# Patient Record
Sex: FEMALE | Race: WHITE | Employment: OTHER | URBAN - METROPOLITAN AREA
[De-identification: names, ages, dates, MRNs, and addresses within clinical notes are randomized per-mention and may not be internally consistent; named-entity substitution may affect disease eponyms.]

---

## 2017-01-06 ENCOUNTER — APPOINTMENT (OUTPATIENT)
Dept: GENERAL RADIOLOGY | Facility: HOSPITAL | Age: 67
DRG: 470 | End: 2017-01-06
Attending: ORTHOPAEDIC SURGERY
Payer: MEDICARE

## 2017-01-06 ENCOUNTER — ANESTHESIA (OUTPATIENT)
Dept: SURGERY | Facility: HOSPITAL | Age: 67
DRG: 470 | End: 2017-01-06
Payer: MEDICARE

## 2017-01-06 ENCOUNTER — ANESTHESIA EVENT (OUTPATIENT)
Dept: SURGERY | Facility: HOSPITAL | Age: 67
DRG: 470 | End: 2017-01-06
Payer: MEDICARE

## 2017-01-06 ENCOUNTER — SURGERY (OUTPATIENT)
Age: 67
End: 2017-01-06

## 2017-01-06 PROCEDURE — 73560 X-RAY EXAM OF KNEE 1 OR 2: CPT

## 2017-01-06 NOTE — ANESTHESIA PREPROCEDURE EVALUATION
PRE-OP EVALUATION    Patient Name: Lara Garcia    Pre-op Diagnosis: OSTEOARTHRITIS LEFT KNEE    Procedure(s):  LEFT TOTAL KNEE ARTHROPLASTY    Surgeon(s) and Role:     Anderson Quispe MD - Primary    Pre-op vitals reviewed.   Temp: 98.9 °F (37.2 °C) child        Smoking status: Never Smoker     Smokeless tobacco: Not on file    Alcohol Use: Yes    Comment: occasionally       Drug Use: No     Available pre-op labs reviewed.     Lab Results  Component Value Date   WBC 5.9 12/27/2016   RBC 4.26 12/27/2016

## 2017-01-06 NOTE — ANESTHESIA POSTPROCEDURE EVALUATION
[facility] Patient Status:  Surgery Admit   Age/Gender 77year old female MRN [id number]   [facility] SURGERY Attending Sunny Cervantes MD   Hosp Day # 0 PCP CAREY Tristan       Anesthesia Post-op Note    Procedure(s)

## 2017-01-09 PROBLEM — Z47.89 ORTHOPEDIC AFTERCARE: Status: ACTIVE | Noted: 2017-01-09

## 2017-01-29 PROBLEM — T84.89XA POSTOPERATIVE STIFFNESS OF TOTAL KNEE REPLACEMENT, INITIAL ENCOUNTER (HCC): Status: ACTIVE | Noted: 2017-01-29

## 2017-01-29 PROBLEM — Z96.659 POSTOPERATIVE STIFFNESS OF TOTAL KNEE REPLACEMENT, INITIAL ENCOUNTER (HCC): Status: ACTIVE | Noted: 2017-01-29

## 2017-01-29 PROBLEM — M25.669 POSTOPERATIVE STIFFNESS OF TOTAL KNEE REPLACEMENT, INITIAL ENCOUNTER (HCC): Status: ACTIVE | Noted: 2017-01-29

## 2017-01-29 PROBLEM — T84.89XA POSTOPERATIVE STIFFNESS OF TOTAL KNEE REPLACEMENT, INITIAL ENCOUNTER: Status: ACTIVE | Noted: 2017-01-29

## 2017-01-29 PROBLEM — Z96.652 S/P TOTAL KNEE REPLACEMENT USING CEMENT, LEFT: Status: ACTIVE | Noted: 2017-01-29

## 2017-01-29 PROBLEM — Z96.659 POSTOPERATIVE STIFFNESS OF TOTAL KNEE REPLACEMENT, INITIAL ENCOUNTER: Status: ACTIVE | Noted: 2017-01-29

## 2017-01-29 PROBLEM — M25.669 POSTOPERATIVE STIFFNESS OF TOTAL KNEE REPLACEMENT, INITIAL ENCOUNTER: Status: ACTIVE | Noted: 2017-01-29

## 2017-05-31 ENCOUNTER — HOSPITAL ENCOUNTER (OUTPATIENT)
Dept: MAMMOGRAPHY | Facility: HOSPITAL | Age: 67
Discharge: HOME | End: 2017-05-31
Attending: INTERNAL MEDICINE
Payer: MEDICARE

## 2017-05-31 DIAGNOSIS — C50.919 MALIGNANT NEOPLASM OF BREAST (FEMALE) (HCC): ICD-10-CM

## 2017-05-31 DIAGNOSIS — Z12.31 ENCOUNTER FOR SCREENING MAMMOGRAM FOR MALIGNANT NEOPLASM OF BREAST: ICD-10-CM

## 2017-05-31 PROCEDURE — 77067 SCR MAMMO BI INCL CAD: CPT | Performed by: INTERNAL MEDICINE

## 2017-11-07 PROCEDURE — 88175 CYTOPATH C/V AUTO FLUID REDO: CPT | Performed by: OBSTETRICS & GYNECOLOGY

## 2018-06-07 ENCOUNTER — HOSPITAL ENCOUNTER (OUTPATIENT)
Dept: MAMMOGRAPHY | Facility: HOSPITAL | Age: 68
Discharge: HOME | End: 2018-06-07
Attending: INTERNAL MEDICINE
Payer: MEDICARE

## 2018-06-07 DIAGNOSIS — Z12.31 VISIT FOR SCREENING MAMMOGRAM: ICD-10-CM

## 2018-06-07 PROCEDURE — 77067 SCR MAMMO BI INCL CAD: CPT | Performed by: INTERNAL MEDICINE

## 2018-06-07 PROCEDURE — 77063 BREAST TOMOSYNTHESIS BI: CPT | Performed by: INTERNAL MEDICINE

## 2018-06-18 ENCOUNTER — HOSPITAL ENCOUNTER (OUTPATIENT)
Dept: MAMMOGRAPHY | Facility: HOSPITAL | Age: 68
Discharge: HOME | End: 2018-06-18
Attending: INTERNAL MEDICINE
Payer: MEDICARE

## 2018-06-18 DIAGNOSIS — R92.2 INCONCLUSIVE MAMMOGRAM: ICD-10-CM

## 2018-06-18 PROCEDURE — 77062 BREAST TOMOSYNTHESIS BI: CPT | Performed by: INTERNAL MEDICINE

## 2018-06-18 PROCEDURE — 77066 DX MAMMO INCL CAD BI: CPT | Performed by: INTERNAL MEDICINE

## 2018-06-18 PROCEDURE — 76641 ULTRASOUND BREAST COMPLETE: CPT | Performed by: INTERNAL MEDICINE

## 2018-06-18 NOTE — IMAGING NOTE
Assisted Dr. Angie Arauz with Recommendation for a 3 site Ultrasound guided bilateral breast biopsy. Procedure explained and written information given to Siddhartha Quezada. Emotional support provided and all questions answered.   Our breast center schedulers will

## 2018-06-21 ENCOUNTER — HOSPITAL ENCOUNTER (OUTPATIENT)
Dept: MAMMOGRAPHY | Facility: HOSPITAL | Age: 68
Discharge: HOME | End: 2018-06-21
Attending: INTERNAL MEDICINE
Payer: MEDICARE

## 2018-06-21 DIAGNOSIS — R92.8 ABNORMAL MAMMOGRAM: ICD-10-CM

## 2018-06-21 PROCEDURE — 19084 BX BREAST ADD LESION US IMAG: CPT | Performed by: INTERNAL MEDICINE

## 2018-06-21 PROCEDURE — 19083 BX BREAST 1ST LESION US IMAG: CPT | Performed by: INTERNAL MEDICINE

## 2018-06-21 PROCEDURE — 88360 TUMOR IMMUNOHISTOCHEM/MANUAL: CPT

## 2018-06-21 PROCEDURE — 88305 TISSUE EXAM BY PATHOLOGIST: CPT

## 2018-06-21 PROCEDURE — 77066 DX MAMMO INCL CAD BI: CPT | Performed by: INTERNAL MEDICINE

## 2018-06-21 NOTE — IMAGING NOTE
This RN called into Ultrasound biopsy room for pt that \"is going down\". Dr. Lety Benson at bedside. Pt on cart, awake and talking. Diaphoretic and dizzy per tech. Pt states, \"I feel better now. \" Pt was s/p breast biopsy. HR 68, BP lying 134/71.   Enmanuel Davis

## 2018-06-25 ENCOUNTER — TELEPHONE (OUTPATIENT)
Dept: MAMMOGRAPHY | Facility: HOSPITAL | Age: 68
End: 2018-06-25

## 2018-06-25 NOTE — TELEPHONE ENCOUNTER
Telephoned Tushar Sampson and name,  verified with pt. Notified [facility] of left breast US bx result of IDC and ADH and RB US biopsy result of IDC. Emotional support given. Pt sts she was expecting this news.  She declined offer to come in for re

## 2018-06-26 ENCOUNTER — TELEPHONE (OUTPATIENT)
Dept: HEMATOLOGY/ONCOLOGY | Facility: HOSPITAL | Age: 68
End: 2018-06-26

## 2018-06-26 NOTE — TELEPHONE ENCOUNTER
Spoke with patient on the telephone. Introduced myself as breast navigator and explained my role. Explained the multidisciplinary conference and that her case was discussed this morning.  In light of that conversation, it was recommended that she meet with

## 2018-06-27 ENCOUNTER — GENETICS ENCOUNTER (OUTPATIENT)
Dept: GENETICS | Facility: HOSPITAL | Age: 68
End: 2018-06-27
Attending: GENETIC COUNSELOR, MS
Payer: MEDICARE

## 2018-06-27 PROCEDURE — 96040 HC GENETIC COUNSELING EA 30 MIN: CPT | Performed by: GENETIC COUNSELOR, MS

## 2018-06-28 NOTE — PROGRESS NOTES
Referring Provider: Rolena Schwab, MD    Additional Provider:   Betty Rodrigez MD    Reason for Referral:  Raymundo Valdez was referred for genetic counseling because of a personal and family history of breast cancer.   Ms. Ranjeet Barfield is a 79year-old woman following information was discussed with Ms. Reanna Pierre. Genetics of Breast and Ovarian Cancer: In the [place], approximately 1 in 8 women (12%) will develop breast cancer.   Although the vast majority (75-85%) of breast cancer cases are sporadic, ap have a pathogenic variant in one of these genes that Ms. Kenisha Sun did not inherit. In this scenario, options for cancer screening/management should be determined according to personal and family histories and should be discussed with a physicianChristiano strong Genetic testing on Ms. Kim Banda for BRCA1/2 pathogenic variants as part of a multigene panel is indicated. At the conclusion of the counseling session Ms. Kim Banda decided to proceed with genetic testing.   She has a family vacation planned for 7/28/18 a

## 2018-06-29 ENCOUNTER — NURSE NAVIGATOR ENCOUNTER (OUTPATIENT)
Dept: HEMATOLOGY/ONCOLOGY | Facility: HOSPITAL | Age: 68
End: 2018-06-29

## 2018-06-29 ENCOUNTER — OFFICE VISIT (OUTPATIENT)
Dept: SURGERY | Facility: CLINIC | Age: 68
End: 2018-06-29

## 2018-06-29 VITALS
DIASTOLIC BLOOD PRESSURE: 84 MMHG | RESPIRATION RATE: 20 BRPM | WEIGHT: 166 LBS | HEIGHT: 69 IN | HEART RATE: 72 BPM | BODY MASS INDEX: 24.59 KG/M2 | SYSTOLIC BLOOD PRESSURE: 127 MMHG | OXYGEN SATURATION: 99 %

## 2018-06-29 DIAGNOSIS — C50.911 BILATERAL MALIGNANT NEOPLASM OF BREAST IN FEMALE, ESTROGEN RECEPTOR POSITIVE, UNSPECIFIED SITE OF BREAST (HCC): Primary | ICD-10-CM

## 2018-06-29 DIAGNOSIS — Z17.0 BILATERAL MALIGNANT NEOPLASM OF BREAST IN FEMALE, ESTROGEN RECEPTOR POSITIVE, UNSPECIFIED SITE OF BREAST (HCC): Primary | ICD-10-CM

## 2018-06-29 DIAGNOSIS — C50.912 BILATERAL MALIGNANT NEOPLASM OF BREAST IN FEMALE, ESTROGEN RECEPTOR POSITIVE, UNSPECIFIED SITE OF BREAST (HCC): Primary | ICD-10-CM

## 2018-06-29 PROCEDURE — 99205 OFFICE O/P NEW HI 60 MIN: CPT | Performed by: SURGERY

## 2018-06-29 RX ORDER — AMPICILLIN TRIHYDRATE 250 MG
500 CAPSULE ORAL DAILY
COMMUNITY

## 2018-06-29 RX ORDER — ATORVASTATIN CALCIUM 10 MG/1
20 TABLET, FILM COATED ORAL NIGHTLY
COMMUNITY
End: 2020-02-25

## 2018-06-29 NOTE — PROGRESS NOTES
Met with patient in clinic. Introduced myself as the breast navigator nurse and explained my role and how I will work with all of the physicians involved in her care.  Explained the role of all of the physicians involved in her care including the surgeonkavin

## 2018-06-29 NOTE — PROGRESS NOTES
Reviewed plan of care. I let her know someone would contact her with an appointment with plastic surgery. Questions addressed.

## 2018-06-29 NOTE — PROGRESS NOTES
Breast Surgery New Patient Consultation    This is the first visit for this 79year old woman, referred by Dr. Beau Beckett, who presents for evaluation of breast cancer.     History of Present Illness:   Ms. Nenita Villa is a 79year old woman who has a per melanoma-left shoulder   • Menopause     at age 46   • OLIVER (obstructive sleep apnea) 10/10/16-PSG    AHI 8 RDI 14 REM AHI 6 Supine AHI 13 non-supine AHI 2.3 Sao2 Kee 88%   • Osteoarthritis    • Shortness of breath     sometimes with exertion    • Sleep a chills, night sweats, fatigue, generalized weakness, change in appetite or weight loss. HEENT:     The patient denies eye irritation, cataracts, redness, glaucoma, yellowing of the eyes, change in vision or color blindness.  The patient denies hearing lo problems, trembling/tremors, fainting/black outs, dizziness, memory problems, loss of sensation/numbness, problems walking, weakness, tingling or burning in hands/feet.  There is no history of abusive relationship, bipolar disorder, sleep disturbance, anxie non tender. The liver is not enlarged. There are no palpable masses. Lymph Nodes: The supraclavicular, axillary and cervical regions are free of significant lymphadenopathy. Back: There is no vertebral column tenderness.     Skin: The skin appears no equal with these two approaches. If breast conservation is elected, I explained the need for free margins, the possibility of re-excision to achieve free margins, and the need for post-operative radiotherapy.  The approach to phuong staging was also describe

## 2018-07-13 ENCOUNTER — GENETICS ENCOUNTER (OUTPATIENT)
Dept: HEMATOLOGY/ONCOLOGY | Facility: HOSPITAL | Age: 68
End: 2018-07-13

## 2018-07-13 NOTE — PROGRESS NOTES
Mary Thayer had genetic testing performed on 6/27/18 because of a personal and family history of breast cancer. No mutation was found in the following 9 genes: VANDANA, BRCA1, BRCA2, CDH1, CHEK2, PALB2, PTEN, STK11, and TP53.   Please refer to the report

## 2018-07-18 ENCOUNTER — GENETICS ENCOUNTER (OUTPATIENT)
Dept: HEMATOLOGY/ONCOLOGY | Facility: HOSPITAL | Age: 68
End: 2018-07-18

## 2018-07-18 NOTE — PROGRESS NOTES
Referring Provider:       Juan Rao MD     Additional Provider:     MD Radha Brantley MD    Reason for Referral:  Myriam Douglas had genetic testing performed on 6/27/18 because of a personal and family history of breast cancer. history of cancer. Individuals who inherit a MUTYH mutation from each parent have the condition MUTYH-associated polyposis (MAP).   Affected individuals typically develop moderate polyposis by their 30s-50s with colorectal cancer developing at a mean with any additional questions about her genetic test results.       Cc:  Audrey Pope

## 2018-07-23 ENCOUNTER — MEDICAL CORRESPONDENCE (OUTPATIENT)
Dept: SURGERY | Facility: CLINIC | Age: 68
End: 2018-07-23

## 2018-07-23 NOTE — PROGRESS NOTES
Received Pt's pre op clearance from Jewell Aponte D.O.'s office. Pt has Consult tomorrow 7/24/18 with Dr. Keren Low to discuss options for breast reconstruction, and has a TENTATIVE hold date for surgery, joint with Dr. Sofia Shone, on 8/13/18.  Awaiting s

## 2018-07-24 ENCOUNTER — OFFICE VISIT (OUTPATIENT)
Dept: SURGERY | Facility: CLINIC | Age: 68
End: 2018-07-24
Payer: MEDICARE

## 2018-07-24 VITALS — BODY MASS INDEX: 25.32 KG/M2 | WEIGHT: 169 LBS | HEIGHT: 68.5 IN

## 2018-07-24 DIAGNOSIS — Z90.13 ABSENCE OF BREAST, BILATERAL: Primary | ICD-10-CM

## 2018-07-24 PROCEDURE — 99203 OFFICE O/P NEW LOW 30 MIN: CPT | Performed by: SURGERY

## 2018-07-24 NOTE — CONSULTS
New Patient Consultation    This is the first visit for this 76year old female who presents to discuss reconstructive options following surgery for breast cancer. History of Present Illness:    The patient is a 76year old female who presents with a byron No outpatient prescriptions have been marked as taking for the 7/24/18 encounter (Office Visit) with Valarie Scott MD.      Allergies:      Codeine                 OTHER (SEE COMMENTS)    Comment:Agitated and Jittery  Morphine                Severa Loveless pain.    Genitourinary:  The patient denies frequent urination, needing to get up at night to urinate, urinary hesitancy or retaining urine, painful urination, urinary incontinence, decreased urine stream, blood in urine, or vaginal/penile discharge.     Sk to inframammary fold 12cm, base diameter 15cm. The skin, nipple, and areola appear normal.  There is no nipple retraction or discharge. There are no dominant masses in the breast.     Left breast:  Grade 2 ptosis is noted. Striae are noted.  Measurements were reviewed.  The expected post-operative course was discussed including the need for activity limitation, drains, and suture removal. Finally, we reviewed the impact of post-mastectomy radiation therapy on implant-based reconstruction (should it be deeme

## 2018-07-25 ENCOUNTER — TELEPHONE (OUTPATIENT)
Dept: HEMATOLOGY/ONCOLOGY | Facility: HOSPITAL | Age: 68
End: 2018-07-25

## 2018-07-25 NOTE — TELEPHONE ENCOUNTER
Spoke with patient on the telephone regarding surgical planning. Patient wishes to move forward with bilateral mastectomies without reconstruction.  Emotional support given and we discussed how that once surgery is done, we will work with her to get her to

## 2018-07-26 ENCOUNTER — TELEPHONE (OUTPATIENT)
Dept: SURGERY | Facility: CLINIC | Age: 68
End: 2018-07-26

## 2018-07-26 NOTE — TELEPHONE ENCOUNTER
Questions addressed related to post op questions. Reviewed pre op instructions as well, and copy mailed to her home. Pt to call with any additional questions.

## 2018-08-13 ENCOUNTER — TELEPHONE (OUTPATIENT)
Dept: MAMMOGRAPHY | Facility: HOSPITAL | Age: 68
End: 2018-08-13

## 2018-08-13 NOTE — TELEPHONE ENCOUNTER
LM to call back. Attempting to reach pt to discuss Andres SLN procedure done in [facility] [place] [facility] prior to Andres mastectomy with Dr. Karen Hernandez.

## 2018-08-14 ENCOUNTER — APPOINTMENT (OUTPATIENT)
Dept: LAB | Facility: HOSPITAL | Age: 68
End: 2018-08-14
Payer: MEDICARE

## 2018-08-14 DIAGNOSIS — C50.911 BILATERAL MALIGNANT NEOPLASM OF BREAST IN FEMALE, ESTROGEN RECEPTOR POSITIVE, UNSPECIFIED SITE OF BREAST (HCC): ICD-10-CM

## 2018-08-14 DIAGNOSIS — C50.912 BILATERAL MALIGNANT NEOPLASM OF BREAST IN FEMALE, ESTROGEN RECEPTOR POSITIVE, UNSPECIFIED SITE OF BREAST (HCC): ICD-10-CM

## 2018-08-14 DIAGNOSIS — Z17.0 BILATERAL MALIGNANT NEOPLASM OF BREAST IN FEMALE, ESTROGEN RECEPTOR POSITIVE, UNSPECIFIED SITE OF BREAST (HCC): ICD-10-CM

## 2018-08-14 LAB
ANION GAP SERPL CALC-SCNC: 6 MMOL/L (ref 0–18)
BUN BLD-MCNC: 20 MG/DL (ref 8–20)
BUN/CREAT SERPL: 20.4 (ref 10–20)
CALCIUM BLD-MCNC: 9.2 MG/DL (ref 8.3–10.3)
CHLORIDE SERPL-SCNC: 106 MMOL/L (ref 101–111)
CO2 SERPL-SCNC: 29 MMOL/L (ref 22–32)
CREAT BLD-MCNC: 0.98 MG/DL (ref 0.55–1.02)
GLUCOSE BLD-MCNC: 96 MG/DL (ref 70–99)
OSMOLALITY SERPL CALC.SUM OF ELEC: 294 MOSM/KG (ref 275–295)
POTASSIUM SERPL-SCNC: 4.6 MMOL/L (ref 3.6–5.1)
SODIUM SERPL-SCNC: 141 MMOL/L (ref 136–144)

## 2018-08-14 PROCEDURE — 80048 BASIC METABOLIC PNL TOTAL CA: CPT

## 2018-08-14 PROCEDURE — 36415 COLL VENOUS BLD VENIPUNCTURE: CPT

## 2018-08-16 ENCOUNTER — HOSPITAL ENCOUNTER (OUTPATIENT)
Facility: HOSPITAL | Age: 68
Discharge: HOME | End: 2018-08-17
Attending: SURGERY | Admitting: SURGERY
Payer: MEDICARE

## 2018-08-16 ENCOUNTER — SURGERY (OUTPATIENT)
Age: 68
End: 2018-08-16

## 2018-08-16 ENCOUNTER — HOSPITAL ENCOUNTER (OUTPATIENT)
Dept: NUCLEAR MEDICINE | Facility: HOSPITAL | Age: 68
Discharge: HOME | End: 2018-08-16
Attending: SURGERY
Payer: MEDICARE

## 2018-08-16 ENCOUNTER — ANESTHESIA (OUTPATIENT)
Dept: SURGERY | Facility: HOSPITAL | Age: 68
End: 2018-08-16
Payer: MEDICARE

## 2018-08-16 ENCOUNTER — ANESTHESIA EVENT (OUTPATIENT)
Dept: SURGERY | Facility: HOSPITAL | Age: 68
End: 2018-08-16
Payer: MEDICARE

## 2018-08-16 DIAGNOSIS — Z17.0 BILATERAL MALIGNANT NEOPLASM OF BREAST IN FEMALE, ESTROGEN RECEPTOR POSITIVE, UNSPECIFIED SITE OF BREAST (HCC): ICD-10-CM

## 2018-08-16 DIAGNOSIS — C50.912 BILATERAL MALIGNANT NEOPLASM OF BREAST IN FEMALE, ESTROGEN RECEPTOR POSITIVE, UNSPECIFIED SITE OF BREAST (HCC): ICD-10-CM

## 2018-08-16 DIAGNOSIS — Z17.0 BILATERAL MALIGNANT NEOPLASM OF BREAST IN FEMALE, ESTROGEN RECEPTOR POSITIVE, UNSPECIFIED SITE OF BREAST (HCC): Primary | ICD-10-CM

## 2018-08-16 DIAGNOSIS — C50.911 BILATERAL MALIGNANT NEOPLASM OF BREAST IN FEMALE, ESTROGEN RECEPTOR POSITIVE, UNSPECIFIED SITE OF BREAST (HCC): ICD-10-CM

## 2018-08-16 DIAGNOSIS — C50.912 BILATERAL MALIGNANT NEOPLASM OF BREAST IN FEMALE, ESTROGEN RECEPTOR POSITIVE, UNSPECIFIED SITE OF BREAST (HCC): Primary | ICD-10-CM

## 2018-08-16 DIAGNOSIS — C50.911 BILATERAL MALIGNANT NEOPLASM OF BREAST IN FEMALE, ESTROGEN RECEPTOR POSITIVE, UNSPECIFIED SITE OF BREAST (HCC): Primary | ICD-10-CM

## 2018-08-16 PROCEDURE — 07B50ZX EXCISION OF RIGHT AXILLARY LYMPHATIC, OPEN APPROACH, DIAGNOSTIC: ICD-10-PCS | Performed by: SURGERY

## 2018-08-16 PROCEDURE — 07B60ZX EXCISION OF LEFT AXILLARY LYMPHATIC, OPEN APPROACH, DIAGNOSTIC: ICD-10-PCS | Performed by: SURGERY

## 2018-08-16 PROCEDURE — 88342 IMHCHEM/IMCYTCHM 1ST ANTB: CPT | Performed by: SURGERY

## 2018-08-16 PROCEDURE — 78195 LYMPH SYSTEM IMAGING: CPT | Performed by: SURGERY

## 2018-08-16 PROCEDURE — 88331 PATH CONSLTJ SURG 1 BLK 1SPC: CPT | Performed by: SURGERY

## 2018-08-16 PROCEDURE — 88360 TUMOR IMMUNOHISTOCHEM/MANUAL: CPT | Performed by: SURGERY

## 2018-08-16 PROCEDURE — 88341 IMHCHEM/IMCYTCHM EA ADD ANTB: CPT | Performed by: SURGERY

## 2018-08-16 PROCEDURE — A4216 STERILE WATER/SALINE, 10 ML: HCPCS

## 2018-08-16 PROCEDURE — 88307 TISSUE EXAM BY PATHOLOGIST: CPT | Performed by: SURGERY

## 2018-08-16 PROCEDURE — 0HBV0ZZ EXCISION OF BILATERAL BREAST, OPEN APPROACH: ICD-10-PCS | Performed by: SURGERY

## 2018-08-16 RX ORDER — DEXAMETHASONE SODIUM PHOSPHATE 4 MG/ML
4 VIAL (ML) INJECTION AS NEEDED
Status: DISCONTINUED | OUTPATIENT
Start: 2018-08-16 | End: 2018-08-16 | Stop reason: HOSPADM

## 2018-08-16 RX ORDER — NALOXONE HYDROCHLORIDE 0.4 MG/ML
80 INJECTION, SOLUTION INTRAMUSCULAR; INTRAVENOUS; SUBCUTANEOUS AS NEEDED
Status: DISCONTINUED | OUTPATIENT
Start: 2018-08-16 | End: 2018-08-16 | Stop reason: HOSPADM

## 2018-08-16 RX ORDER — DEXTROSE, SODIUM CHLORIDE, AND POTASSIUM CHLORIDE 5; .45; .15 G/100ML; G/100ML; G/100ML
INJECTION INTRAVENOUS CONTINUOUS
Status: DISCONTINUED | OUTPATIENT
Start: 2018-08-16 | End: 2018-08-17

## 2018-08-16 RX ORDER — LIDOCAINE AND PRILOCAINE 25; 25 MG/G; MG/G
CREAM TOPICAL ONCE
Status: COMPLETED | OUTPATIENT
Start: 2018-08-16 | End: 2018-08-16

## 2018-08-16 RX ORDER — ATENOLOL 25 MG/1
25 TABLET ORAL NIGHTLY
Status: DISCONTINUED | OUTPATIENT
Start: 2018-08-16 | End: 2018-08-17

## 2018-08-16 RX ORDER — DEXTROSE, SODIUM CHLORIDE, AND POTASSIUM CHLORIDE 5; .45; .15 G/100ML; G/100ML; G/100ML
INJECTION INTRAVENOUS
Status: COMPLETED
Start: 2018-08-16 | End: 2018-08-16

## 2018-08-16 RX ORDER — IBUPROFEN 200 MG
200 TABLET ORAL EVERY 4 HOURS PRN
Status: DISCONTINUED | OUTPATIENT
Start: 2018-08-16 | End: 2018-08-17

## 2018-08-16 RX ORDER — DOCUSATE SODIUM 100 MG/1
100 CAPSULE, LIQUID FILLED ORAL 2 TIMES DAILY
Status: DISCONTINUED | OUTPATIENT
Start: 2018-08-16 | End: 2018-08-17

## 2018-08-16 RX ORDER — ONDANSETRON 2 MG/ML
4 INJECTION INTRAMUSCULAR; INTRAVENOUS AS NEEDED
Status: DISCONTINUED | OUTPATIENT
Start: 2018-08-16 | End: 2018-08-16 | Stop reason: HOSPADM

## 2018-08-16 RX ORDER — MORPHINE SULFATE 4 MG/ML
2 INJECTION, SOLUTION INTRAMUSCULAR; INTRAVENOUS EVERY 5 MIN PRN
Status: DISCONTINUED | OUTPATIENT
Start: 2018-08-16 | End: 2018-08-16 | Stop reason: HOSPADM

## 2018-08-16 RX ORDER — IBUPROFEN 600 MG/1
600 TABLET ORAL EVERY 4 HOURS PRN
Status: DISCONTINUED | OUTPATIENT
Start: 2018-08-16 | End: 2018-08-17

## 2018-08-16 RX ORDER — DIAZEPAM 5 MG/1
5 TABLET ORAL AS NEEDED
Status: DISCONTINUED | OUTPATIENT
Start: 2018-08-16 | End: 2018-08-16 | Stop reason: HOSPADM

## 2018-08-16 RX ORDER — CEFAZOLIN SODIUM/WATER 2 G/20 ML
2 SYRINGE (ML) INTRAVENOUS ONCE
Status: DISCONTINUED | OUTPATIENT
Start: 2018-08-16 | End: 2018-08-16 | Stop reason: HOSPADM

## 2018-08-16 RX ORDER — ONDANSETRON 2 MG/ML
4 INJECTION INTRAMUSCULAR; INTRAVENOUS EVERY 6 HOURS PRN
Status: DISCONTINUED | OUTPATIENT
Start: 2018-08-16 | End: 2018-08-17

## 2018-08-16 RX ORDER — DIPHENHYDRAMINE HCL 25 MG
25 CAPSULE ORAL NIGHTLY PRN
Status: DISCONTINUED | OUTPATIENT
Start: 2018-08-16 | End: 2018-08-17

## 2018-08-16 RX ORDER — ACETAMINOPHEN 500 MG
1000 TABLET ORAL ONCE
Status: DISCONTINUED | OUTPATIENT
Start: 2018-08-16 | End: 2018-08-16 | Stop reason: HOSPADM

## 2018-08-16 RX ORDER — SODIUM CHLORIDE, SODIUM LACTATE, POTASSIUM CHLORIDE, CALCIUM CHLORIDE 600; 310; 30; 20 MG/100ML; MG/100ML; MG/100ML; MG/100ML
INJECTION, SOLUTION INTRAVENOUS CONTINUOUS
Status: DISCONTINUED | OUTPATIENT
Start: 2018-08-16 | End: 2018-08-16 | Stop reason: HOSPADM

## 2018-08-16 RX ORDER — IBUPROFEN 400 MG/1
400 TABLET ORAL EVERY 4 HOURS PRN
Status: DISCONTINUED | OUTPATIENT
Start: 2018-08-16 | End: 2018-08-17

## 2018-08-16 RX ORDER — TRAMADOL HYDROCHLORIDE 50 MG/1
TABLET ORAL EVERY 6 HOURS PRN
Status: DISCONTINUED | OUTPATIENT
Start: 2018-08-16 | End: 2018-08-17

## 2018-08-16 RX ORDER — ATORVASTATIN CALCIUM 10 MG/1
10 TABLET, FILM COATED ORAL NIGHTLY
Status: DISCONTINUED | OUTPATIENT
Start: 2018-08-16 | End: 2018-08-17

## 2018-08-16 RX ORDER — METOCLOPRAMIDE HYDROCHLORIDE 5 MG/ML
10 INJECTION INTRAMUSCULAR; INTRAVENOUS AS NEEDED
Status: DISCONTINUED | OUTPATIENT
Start: 2018-08-16 | End: 2018-08-16 | Stop reason: HOSPADM

## 2018-08-16 RX ORDER — MIDAZOLAM HYDROCHLORIDE 1 MG/ML
1 INJECTION INTRAMUSCULAR; INTRAVENOUS EVERY 5 MIN PRN
Status: DISCONTINUED | OUTPATIENT
Start: 2018-08-16 | End: 2018-08-16 | Stop reason: HOSPADM

## 2018-08-16 RX ORDER — ATENOLOL 50 MG/1
25 TABLET ORAL NIGHTLY
COMMUNITY

## 2018-08-16 RX ORDER — LIDOCAINE AND PRILOCAINE 25; 25 MG/G; MG/G
CREAM TOPICAL
Status: COMPLETED
Start: 2018-08-16 | End: 2018-08-16

## 2018-08-16 NOTE — PROGRESS NOTES
NURSING ADMISSION NOTE      Patient admitted via Cart  Oriented to room. Safety precautions initiated. Bed in low position. Call light in reach. RECEIVED PATIENT FROM PACU , ALERT AND ORIENT X 4 , ON ROOM AIR , CPOX , O2 SAT 96% .  B/L BREAST DRESSIN

## 2018-08-16 NOTE — IMAGING NOTE
Nuc Med performed Bilateral Lymphoscintigraphy of breast for LN removal and bilateral mastectomy. Procedure explained and all questions answered. Pt verbalized understanding. Emotional support provided and pt tolerated procedure well with some discomfort.

## 2018-08-16 NOTE — ANESTHESIA PREPROCEDURE EVALUATION
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 Cardiovascular      ECG reviewed.   Exercise tolerance: good     MET: >4      (+) hypertension                  (+) dysrhythmias and PVC                  Endo/Other                           (+) arthritis

## 2018-08-16 NOTE — BRIEF OP NOTE
Pre-Operative Diagnosis: Bilateral malignant neoplasm of breast in female, estrogen receptor positive, unspecified site of breast ([facility] [C50.911, Z17.0, C50.912]     Post-Operative Diagnosis: Bilateral malignant neoplasm of breast in female, estrogen recep

## 2018-08-16 NOTE — ANESTHESIA POSTPROCEDURE EVALUATION
[facility] Patient Status:  Surgery Admit   Age/Gender 76year old female MRN [id number]   Location [facility] Attending Aristides Alvares MD   Hosp Day # 0 PCP CAREY Mendoza       Anesthesia Post

## 2018-08-16 NOTE — PAYOR COMM NOTE
--------------  ADMISSION REVIEW     Payor: BCBS MEDICARE ADV PPO  Subscriber #:  [id number]  Authorization Number: N/A    Admit date: 8/16/18  Admit time: 1448       Admitting Physician: Butch Woods MD  Attending Physician:  Butch Woods MD She denies any palpable masses, skin changes, nipple discharge or axillary symptoms. She does have a remote history of a left breast needle biopsy in 1997 that was reportedly benign.   She has a family history of breast cancer and met with our genetic coun Morphine                HALLUCINATION  Vicodin [Hydrocodon*    HALLUCINATION     Family History:         Family History   Problem Relation Age of Onset   • Breast Cancer Self 61   • Breast Cancer Mother [de-identified]   • kidney cancer [OTHER] Father     • Breast Canc The patient denies frequent urination, +needing to get up at night to urinate, urinary hesitancy or retaining urine, painful urination, urinary incontinence, decreased urine stream, blood in the urine or vaginal/penile discharge.     Skin:    The patient d Right breast: The skin, nipple ,and areola appear normal. There is no skin dimpling with movement of the pectoralis. There is no nipple retraction. No nipple discharge can be elicited. The parenchyma is mildly nodular.  There are no dominant masses in the b We discussed the significance and implications of a genetic mutation including how this may affect her surgical decision making. The patient has a personal history of right breast cancer as well as family history of breast cancer.   Though she had a prior Following this discussion, where all of the patient's questions were answered, the patient conveyed that she would be interested in bilateral mastectomies if she is found to be a genetic carrier. Currently those results are not available.   I have therefor 8/16/2018 0811 Given 5 mg Oral Jace Shen RN      dextrose 5 % and 0.45 % NaCl with KCl 20 mEq infusion     Date Action Dose Route User    8/16/2018 1358 New Bag (none) Intravenous Vicente Carrasco, RN      lactated ringers infusion     Date

## 2018-08-16 NOTE — H&P
History of Present Illness:   Ms. Belen Thompson is a 79year old woman who has a personal history of right breast cancer treated in June 2013 with lumpectomy, sentinel lymph node biopsy (2 lymph nodes removed at that time ER/RI positive, HER-2/misael negati Sao2 Kee 88%   • Osteoarthritis     • Shortness of breath       sometimes with exertion    • Sleep apnea     • Visual impairment       glasses         Past Surgical History:  No date: D & C      Comment: x 2  6/2013: LUMPECTOMY RIGHT      Comment: Intrac irritation, cataracts, redness, glaucoma, yellowing of the eyes, change in vision or color blindness.  The patient denies hearing loss, ringing in the ears, ear drainage, earaches, nasal congestion, nose bleeds, snoring, pain in mouth/throat, hoarseness, ch walking, weakness, tingling or burning in hands/feet. There is no history of abusive relationship, bipolar disorder, sleep disturbance, anxiety, depression or feeling of despair.     Endocrine:     There is no history of poor/slow wound healing, weight loss supraclavicular, axillary and cervical regions are free of significant lymphadenopathy.     Back: There is no vertebral column tenderness.     Skin: The skin appears normal. There are no suspicious appearing rashes or lesions.     Extremities:  The extremit the need for free margins, the possibility of re-excision to achieve free margins, and the need for post-operative radiotherapy.  The approach to phuong staging was also described, including the technique, risks and benefits of sentinel node biopsy, the poss risks and side effects of this procedure; the likelihood of the patient achieving goals; and potential problems that might occur during recuperation.   I discussed reasonable alternatives to the procedure, including risks, benefits and side effects related

## 2018-08-17 ENCOUNTER — NURSE NAVIGATOR ENCOUNTER (OUTPATIENT)
Dept: HEMATOLOGY/ONCOLOGY | Facility: HOSPITAL | Age: 68
End: 2018-08-17

## 2018-08-17 VITALS
OXYGEN SATURATION: 97 % | DIASTOLIC BLOOD PRESSURE: 65 MMHG | WEIGHT: 170 LBS | SYSTOLIC BLOOD PRESSURE: 133 MMHG | HEART RATE: 65 BPM | HEIGHT: 68.5 IN | BODY MASS INDEX: 25.47 KG/M2 | TEMPERATURE: 98 F | RESPIRATION RATE: 18 BRPM

## 2018-08-17 RX ORDER — TRAMADOL HYDROCHLORIDE 50 MG/1
TABLET ORAL EVERY 6 HOURS PRN
Qty: 30 TABLET | Refills: 0 | Status: SHIPPED | OUTPATIENT
Start: 2018-08-17 | End: 2018-10-09 | Stop reason: ALTCHOICE

## 2018-08-17 NOTE — PROGRESS NOTES
Patient discharged home at this time. All discharge instructions were reviewed with the patient and her  at the bedside. TRISTAN drain teaching was completed with breast care coordinator, Joseph RN, patient verbalized understanding.  IV access was remove

## 2018-08-17 NOTE — OPERATIVE REPORT
[address]    PATIENT'S NAME: Jasper HURD   ATTENDING PHYSICIAN: Fawn Yoon. Elan Conti M.D. OPERATING PHYSICIAN: Fawn Conti M.D.    PATIENT ACCOUNT#:   [de-identified]    LOCATION:  [facility] OR [place] ROOMS 15 EDWP [address] #:    therapy. The approach to phuong staging was also described including the technique, risks, and benefits of sentinel node biopsy, the possible need for axillary dissection, and the long-term sequelae of this procedure.   She has had a sentinel lymph node bio sent for frozen section analysis on the right, the results of which were negative for the presence of metastatic disease, and, therefore, no further axillary surgery was performed of the right breast.  We then infiltrated the incision line with tumescent s dissection was performed. The tumescent solution was then instilled into the plane between the subcutaneous fat and breast gland with a standard-sized trocar along the incision line. A 15 blade knife was used to incise the skin.   Electrocautery was used

## 2018-08-17 NOTE — PLAN OF CARE
Assumed pt care at 1 for the night shift. Pt s/p bilt mastectomy. POD #0. Resting in bed. Reports mild discomfort to right arm with movement and activity. Surgical bra in place. Drgs c/d/i.TRISTAN x 2 w/bloody output in small amount. IVF infusing as ordered. V

## 2018-08-17 NOTE — PAYOR COMM NOTE
--------------  ADMISSION REVIEW     Payor: BCBS MEDICARE ADV PPO  Subscriber #:  [id number]  Authorization Number: N/A    Admit date: 8/16/18  Admit time: 1448       Admitting Physician: Rosalie Rich MD  Attending Physician:  Rosalie Rich MD reportedly benign. She has a family history of breast cancer and met with our genetic counselor yesterday and genetic testing is currently pending.     She is here today for evaluation and recommendations for further therapy.              Past Medical Hist Breast Cancer Mother [de-identified]   • kidney cancer [OTHER] Father     • Breast Cancer Maternal Grandmother     • Breast Cancer Paternal Grandmother           She is not of Ashkenazi Catholic ancestry.     Social History:     Alcohol use Yes   Comment: occasionally discharge.     Skin:    The patient denies rash, itching, skin lesions, dry skin, change in skin color or change in moles.      Hematologic/Lymphatic:  The patient denies easily bruising or bleeding or persistent swollen glands or lymph nodes.      Musculo There are no dominant masses in the breast. The axillary tail is normal.  There are well-healed incisions on the breast and axilla with no underlying palpable masses.   Left breast:   The skin, nipple, and areola appear normal. There is no skin dimpling wit successful lymphoscintigraphy mapping for purposes of axillary staging.     The natural history and evolution of breast cancer were discussed with Ms.  Avila Weaver and her family, including the difference between in-situ and invasive carcinoma, and the those questions were answered to her satisfaction. She has been  encouraged to contact the office with any questions or concerns prior to her surgery.     Pre-op Diagnosis: Bilateral malignant neoplasm of breast in female, estrogen receptor positive, unspec 50 mg Oral Sunny Nunn RN    8/17/2018 0002 Given 50 mg Oral Sunny Nunn RN        OPERATIVE REPORT     PREOPERATIVE DIAGNOSIS:  Bilateral breast cancer. POSTOPERATIVE DIAGNOSIS:  Bilateral breast cancer.   PROCEDURE PERFORMED:  Bilateral simpl nodes removed previously in the right breast and, therefore, we discussed that she may not have a successful lymphoscintigraphy mapping for purposes of axillary staging but that this will be tried preoperatively to see if this was a possibility.   Preoperat the plane between the subcutaneous fat and breast gland with a standard-sized trocar. The right breast incision was completed with a 15 blade knife for the skin and electrocautery was used for hemostasis.   Then, using sharp dissection and electrocautery, Maik Supercut face-lift scissors and electrocautery were then used to raise the mastectomy flaps on the left to the borders of the clavicle, sternum, inframammary fold, and latissimus tendon laterally, and the left breast was then dissected off the under

## 2018-08-20 ENCOUNTER — TELEPHONE (OUTPATIENT)
Dept: SURGERY | Facility: CLINIC | Age: 68
End: 2018-08-20

## 2018-08-20 NOTE — TELEPHONE ENCOUNTER
I contacted the patient to see how she is doing after her procedure. She is feeling well, eating and drinking, taking walks. She is managing the drains without a problem. Some discomfort by the drains after she strips the tubing.    I let her know the p

## 2018-08-22 ENCOUNTER — TELEPHONE (OUTPATIENT)
Dept: SURGERY | Facility: CLINIC | Age: 68
End: 2018-08-22

## 2018-08-22 NOTE — TELEPHONE ENCOUNTER
I contacted the patient regarding her final pathology. Let her know 1 SLN on the right was negative, and 2 SLN on the left were negative. All margins were negative. Confirmed her post op appt, and drain output requirements to have them removed.    Pt

## 2018-08-28 ENCOUNTER — OFFICE VISIT (OUTPATIENT)
Dept: SURGERY | Facility: CLINIC | Age: 68
End: 2018-08-28
Payer: MEDICARE

## 2018-08-28 ENCOUNTER — NURSE NAVIGATOR ENCOUNTER (OUTPATIENT)
Dept: HEMATOLOGY/ONCOLOGY | Facility: HOSPITAL | Age: 68
End: 2018-08-28

## 2018-08-28 VITALS
WEIGHT: 170 LBS | TEMPERATURE: 97 F | HEART RATE: 67 BPM | SYSTOLIC BLOOD PRESSURE: 172 MMHG | DIASTOLIC BLOOD PRESSURE: 76 MMHG | RESPIRATION RATE: 97 BRPM | OXYGEN SATURATION: 97 % | HEIGHT: 68.5 IN | BODY MASS INDEX: 25.47 KG/M2

## 2018-08-28 DIAGNOSIS — C50.912 BILATERAL MALIGNANT NEOPLASM OF BREAST IN FEMALE, ESTROGEN RECEPTOR POSITIVE, UNSPECIFIED SITE OF BREAST (HCC): Primary | ICD-10-CM

## 2018-08-28 DIAGNOSIS — Z17.0 BILATERAL MALIGNANT NEOPLASM OF BREAST IN FEMALE, ESTROGEN RECEPTOR POSITIVE, UNSPECIFIED SITE OF BREAST (HCC): Primary | ICD-10-CM

## 2018-08-28 DIAGNOSIS — C50.911 BILATERAL MALIGNANT NEOPLASM OF BREAST IN FEMALE, ESTROGEN RECEPTOR POSITIVE, UNSPECIFIED SITE OF BREAST (HCC): Primary | ICD-10-CM

## 2018-08-28 PROCEDURE — 99024 POSTOP FOLLOW-UP VISIT: CPT | Performed by: SURGERY

## 2018-08-28 NOTE — PROGRESS NOTES
Met with patient in clinic, states she is doing well post surgery. Recommendation is to meet with medical oncology, which we will help to coordinate. Drains will remain in place until drainage decreases.  She will phone the office when they meet the goal. P

## 2018-09-04 ENCOUNTER — TELEPHONE (OUTPATIENT)
Dept: SURGERY | Facility: CLINIC | Age: 68
End: 2018-09-04

## 2018-09-05 ENCOUNTER — NURSE ONLY (OUTPATIENT)
Dept: SURGERY | Facility: CLINIC | Age: 68
End: 2018-09-05
Payer: MEDICARE

## 2018-09-05 NOTE — PROGRESS NOTES
The patient presents today for TRISTAN drain evaluation s/p bilateral simple mastectomies with injection of blue dye to bilateral breasts and bilateral sentinel lymph node biopsies.      Per patient's written record, bilateral breast TRISTAN drains with < 30 cc of se

## 2018-09-11 ENCOUNTER — OFFICE VISIT (OUTPATIENT)
Dept: HEMATOLOGY/ONCOLOGY | Facility: HOSPITAL | Age: 68
End: 2018-09-11
Attending: GENETIC COUNSELOR, MS
Payer: MEDICARE

## 2018-09-11 ENCOUNTER — OFFICE VISIT (OUTPATIENT)
Dept: SURGERY | Facility: CLINIC | Age: 68
End: 2018-09-11
Payer: MEDICARE

## 2018-09-11 VITALS
RESPIRATION RATE: 18 BRPM | TEMPERATURE: 98 F | OXYGEN SATURATION: 98 % | DIASTOLIC BLOOD PRESSURE: 84 MMHG | WEIGHT: 168.63 LBS | HEIGHT: 68.5 IN | HEART RATE: 76 BPM | BODY MASS INDEX: 25.26 KG/M2 | SYSTOLIC BLOOD PRESSURE: 167 MMHG

## 2018-09-11 VITALS
BODY MASS INDEX: 25.26 KG/M2 | SYSTOLIC BLOOD PRESSURE: 167 MMHG | OXYGEN SATURATION: 98 % | WEIGHT: 168.63 LBS | HEIGHT: 68.5 IN | HEART RATE: 76 BPM | TEMPERATURE: 98 F | RESPIRATION RATE: 18 BRPM | DIASTOLIC BLOOD PRESSURE: 84 MMHG

## 2018-09-11 DIAGNOSIS — C50.911 BILATERAL MALIGNANT NEOPLASM OF BREAST IN FEMALE, ESTROGEN RECEPTOR POSITIVE, UNSPECIFIED SITE OF BREAST (HCC): ICD-10-CM

## 2018-09-11 DIAGNOSIS — Z17.0 BILATERAL MALIGNANT NEOPLASM OF BREAST IN FEMALE, ESTROGEN RECEPTOR POSITIVE, UNSPECIFIED SITE OF BREAST (HCC): Primary | ICD-10-CM

## 2018-09-11 DIAGNOSIS — Z17.0 BILATERAL MALIGNANT NEOPLASM OF BREAST IN FEMALE, ESTROGEN RECEPTOR POSITIVE, UNSPECIFIED SITE OF BREAST (HCC): ICD-10-CM

## 2018-09-11 DIAGNOSIS — C50.911 BILATERAL MALIGNANT NEOPLASM OF BREAST IN FEMALE, ESTROGEN RECEPTOR POSITIVE, UNSPECIFIED SITE OF BREAST (HCC): Primary | ICD-10-CM

## 2018-09-11 DIAGNOSIS — C50.912 BILATERAL MALIGNANT NEOPLASM OF BREAST IN FEMALE, ESTROGEN RECEPTOR POSITIVE, UNSPECIFIED SITE OF BREAST (HCC): ICD-10-CM

## 2018-09-11 DIAGNOSIS — C50.912 BILATERAL MALIGNANT NEOPLASM OF BREAST IN FEMALE, ESTROGEN RECEPTOR POSITIVE, UNSPECIFIED SITE OF BREAST (HCC): Primary | ICD-10-CM

## 2018-09-11 DIAGNOSIS — M89.9 BONE DISORDER: Primary | ICD-10-CM

## 2018-09-11 PROBLEM — Z90.13 S/P BILATERAL MASTECTOMY: Status: ACTIVE | Noted: 2018-09-11

## 2018-09-11 PROCEDURE — 99205 OFFICE O/P NEW HI 60 MIN: CPT | Performed by: INTERNAL MEDICINE

## 2018-09-11 PROCEDURE — 99024 POSTOP FOLLOW-UP VISIT: CPT | Performed by: SURGERY

## 2018-09-11 RX ORDER — LETROZOLE 2.5 MG/1
2.5 TABLET, FILM COATED ORAL DAILY
Qty: 30 TABLET | Refills: 6 | Status: SHIPPED | OUTPATIENT
Start: 2018-09-11 | End: 2019-02-07

## 2018-09-11 RX ORDER — IBUPROFEN 200 MG
800 TABLET ORAL DAILY PRN
COMMUNITY
End: 2019-01-02

## 2018-09-11 NOTE — PROGRESS NOTES
Outpatient Oncology Care Plan  Problem list:  knowledge deficit    Problems related to:    disease/disease progression    Interventions:  provided general teaching - per MD    Expected outcomes:  understands plan of care    Progress towards outcome:  danial

## 2018-09-11 NOTE — CONSULTS
Cancer Center Report of Consultation    Patient Name: Zachary Pollock   YOB: 1950   Medical Record Number: [id number]   CSN: [id number]   Consulting Physician: Aminta Leroy MD  Referring Physician(s): Carol Burt MD  [facility] patient did well after 2017 knee replacement   • Arrhythmia     palpitations   • Cancer ([facility] 05/2013    right breast-intracystic papilloma   • Cancer [facility])     melanoma-left shoulder   • Hyperlipidemia    • Melanoma ([facility] 2014    left shoulder   • [place] by mouth daily as needed for Pain., Disp: , Rfl:   •  letrozole 2.5 MG Oral Tab, Take 1 tablet (2.5 mg total) by mouth daily. , Disp: 30 tablet, Rfl: 6  •  TraMADol HCl 50 MG Oral Tab, Take 1-2 tablets ( mg total) by mouth every 6 (six) hours as neede in acute distress. HEENT: EOMs intact. PERRL. Oropharynx is clear. Neck: No JVD. No palpable lymphadenopathy. Neck is supple. Lymphatics: There is no palpable lymphadenopathy  in the cervical, axillary, or inguinal regions.   Chest: Clear to auscultatio the adverse effect and lipid panel. Prescription sent to her pharmacy. She should call me if she has any unexpected side effects her co-pay is too high. If she tolerates it, can do 90 day supply in future. Also recommended baseline bone density.   Order

## 2018-09-11 NOTE — PATIENT INSTRUCTIONS
For Dr. Lorin Bradshaw nurse line, call [phone] with any questions or concerns, Monday through Friday 8:00a to 4:30p.      After hours or weekends for emergent needs [phone]    To schedule testing, Central [phone]  For Medical Records: 35

## 2018-09-11 NOTE — PROGRESS NOTES
Cancer Center Report of Consultation    Patient Name: Ge Peguero   YOB: 1950   Medical Record Number: [id number]   CSN: [id number]   Consulting Physician: Lewis De La Fuente MD  Referring Physician(s): Octavio Roman  Date of Consultat History      Marital status:        Spouse name: Not on file      Number of children: Not on file      Years of education: Not on file      Highest education level: Not on file    Social Needs      Financial resource strain: Not on file      Food ins daily., Disp: , Rfl:     Review of Systems:    Constitutional: No chills, fevers, malaise, night sweats and weight loss. Fatigue***  Eyes: No visual disturbance, irritation and redness.   Ears, nose, mouth, throat, and face: No hearing loss, tinnitus, hoars

## 2018-09-26 ENCOUNTER — TELEPHONE (OUTPATIENT)
Dept: SURGERY | Facility: CLINIC | Age: 68
End: 2018-09-26

## 2018-09-26 DIAGNOSIS — Z90.13 S/P BILATERAL MASTECTOMY: Primary | ICD-10-CM

## 2018-09-26 NOTE — TELEPHONE ENCOUNTER
Pt phoned in for orders to be faxed to [facility] for prosthetic bras and prosthesis. Order faxed.  [phone]

## 2018-10-01 ENCOUNTER — HOSPITAL ENCOUNTER (OUTPATIENT)
Dept: BONE DENSITY | Age: 68
Discharge: HOME | End: 2018-10-01
Attending: INTERNAL MEDICINE
Payer: MEDICARE

## 2018-10-01 DIAGNOSIS — M89.9 BONE DISORDER: ICD-10-CM

## 2018-10-01 PROCEDURE — 77080 DXA BONE DENSITY AXIAL: CPT | Performed by: INTERNAL MEDICINE

## 2018-10-02 ENCOUNTER — TELEPHONE (OUTPATIENT)
Dept: HEMATOLOGY/ONCOLOGY | Facility: HOSPITAL | Age: 68
End: 2018-10-02

## 2018-10-02 NOTE — TELEPHONE ENCOUNTER
Santhosh Paredes MD  P [facility] Strauss Rns             Call, Aldaisykarrie Lloyd, just bit worse than 2015.  Lanre OTC wesley/vit D        Unable to reach pt by phone; left detailed message (okay per HIPAA consent) with results/MD orders and requested that pt call

## 2018-10-09 ENCOUNTER — OFFICE VISIT (OUTPATIENT)
Dept: SURGERY | Facility: CLINIC | Age: 68
End: 2018-10-09
Payer: MEDICARE

## 2018-10-09 VITALS
HEART RATE: 74 BPM | BODY MASS INDEX: 25 KG/M2 | WEIGHT: 169 LBS | OXYGEN SATURATION: 99 % | SYSTOLIC BLOOD PRESSURE: 170 MMHG | DIASTOLIC BLOOD PRESSURE: 113 MMHG | TEMPERATURE: 97 F | RESPIRATION RATE: 16 BRPM

## 2018-10-09 DIAGNOSIS — C50.911 BILATERAL MALIGNANT NEOPLASM OF BREAST IN FEMALE, ESTROGEN RECEPTOR POSITIVE, UNSPECIFIED SITE OF BREAST (HCC): Primary | ICD-10-CM

## 2018-10-09 DIAGNOSIS — C50.912 BILATERAL MALIGNANT NEOPLASM OF BREAST IN FEMALE, ESTROGEN RECEPTOR POSITIVE, UNSPECIFIED SITE OF BREAST (HCC): Primary | ICD-10-CM

## 2018-10-09 DIAGNOSIS — Z17.0 BILATERAL MALIGNANT NEOPLASM OF BREAST IN FEMALE, ESTROGEN RECEPTOR POSITIVE, UNSPECIFIED SITE OF BREAST (HCC): Primary | ICD-10-CM

## 2018-10-09 PROCEDURE — 99024 POSTOP FOLLOW-UP VISIT: CPT | Performed by: SURGERY

## 2018-10-16 NOTE — PROGRESS NOTES
Breast Surgery Surveillance Visit    Diagnosis: Bilateral breast cancer status post bilateral mastectomies with bilateral sentinel lymph node biopsies without reconstruction on August 16, 2018.     Stage: Cancer Staging  Bilateral malignant neoplasm of loan reportedly benign. She elected for bilateral mastectomies without reconstruction took place  Without complication. Her surgical drains were removed last week. She denies any fevers, chills, erythema or drainage from the incisions.   She is here today for Tab Take 800 mg by mouth daily as needed for Pain. Disp:  Rfl:    letrozole 2.5 MG Oral Tab Take 1 tablet (2.5 mg total) by mouth daily.  Disp: 30 tablet Rfl: 6   [DISCONTINUED] TraMADol HCl 50 MG Oral Tab Take 1-2 tablets ( mg total) by mouth every 6 mouth/throat, hoarseness, change in voice, facial trauma.     Respiratory:  The patient denies chronic cough, phlegm, hemoptysis, pleurisy/chest pain, pneumonia, asthma, wheezing, difficulty in breathing with exertion, emphysema, chronic bronchitis, shortne weight loss/gain, fertility or hormone problems, cold intolerance, thyroid disease. Allergic/Immunologic:  There is no history of hives, hay fever, angioedema or anaphylaxis.     BP (!) 167/84 (BP Location: Left leg, Patient Position: Sitting, Cuff Size

## 2018-10-16 NOTE — PROGRESS NOTES
Breast Surgery Post-Operative Visit    Diagnosis: Bilateral breast cancer status post bilateral mastectomies with bilateral sentinel lymph node biopsies without reconstruction on August 16, 2018.     Stage: Cancer Staging  Bilateral malignant neoplasm of br was reportedly benign. She elected for bilateral mastectomies without reconstruction took place last week without complication. She has surgical drains in place and remains serous and productive.   She denies any fevers, chills, erythema or drainage from pregnancy. Medications:      [DISCONTINUED] TraMADol HCl 50 MG Oral Tab Take 1-2 tablets ( mg total) by mouth every 6 (six) hours as needed for Pain.  Disp: 30 tablet Rfl: 0   [DISCONTINUED] Acetaminophen (ACETAMINOPHEN EXTRA STRENGTH) 500 MG Oral hoarseness, change in voice, facial trauma.     Respiratory:  The patient denies chronic cough, phlegm, hemoptysis, pleurisy/chest pain, pneumonia, asthma, wheezing, difficulty in breathing with exertion, emphysema, chronic bronchitis, shortness of breath o loss/gain, fertility or hormone problems, cold intolerance, thyroid disease. Allergic/Immunologic:  There is no history of hives, hay fever, angioedema or anaphylaxis.     BP (!) 172/76 (BP Location: Right leg, Patient Position: Sitting, Cuff Size: larg appointment.

## 2018-10-28 NOTE — PROGRESS NOTES
Breast Surgery Surveillance Visit    Diagnosis: Bilateral breast cancer status post bilateral mastectomies with bilateral sentinel lymph node biopsies without reconstruction on August 16, 2018.     Stage: Cancer Staging  Bilateral malignant neoplasm of loan She elected for bilateral mastectomies without reconstruction took place without complication. She is here today for evaluation and recommendations for further therapy.         Past Medical History:   Diagnosis Date   • Anesthesia complication     agitated Disp: 30 tablet Rfl: 6   atenolol 50 MG Oral Tab Take 25 mg by mouth nightly. Disp:  Rfl:    atorvastatin (LIPITOR) 10 MG Oral Tab Take 10 mg by mouth nightly. Disp:  Rfl:    Cinnamon 500 MG Oral Cap Take 500 mg by mouth daily.  Disp:  Rfl:    Cholecalcifer chest pain, chest pressure/discomfort, palpitations, irregular heartbeat, fainting or near-fainting, difficulty breathing when lying flat, SOB/Coughing at night, swelling of the legs or chest pain while walking.     Breasts:  See history of present illness lb)   SpO2 99%   BMI 25.32 kg/m²     Physical Examination:   Examination shows that the surgical sites are clean, dry, and healing well. ROM intact. No extremity swelling.      Impression:   Ms. Riley Salas is a 76year old woman presents with a pers

## 2018-11-12 ENCOUNTER — NURSE NAVIGATOR ENCOUNTER (OUTPATIENT)
Dept: HEMATOLOGY/ONCOLOGY | Facility: HOSPITAL | Age: 68
End: 2018-11-12

## 2018-11-12 NOTE — PROGRESS NOTES
Patient requested pre surgical lab work to be faxed to PCP- Obdulio Beltran at [phone].  Completed per patient request.

## 2019-01-01 NOTE — PROGRESS NOTES
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     HPI:     76year old Rfl:    Cinnamon 500 MG Oral Cap Take 500 mg by mouth daily. Disp:  Rfl:    Cholecalciferol (VITAMIN D3) 2000 UNITS Oral Tab Take by mouth daily. Disp:  Rfl:    Multiple Vitamins-Minerals (MULTI VITAMIN/MINERALS) Oral Tab Take by mouth daily.  Disp:  Rfl: A/G Ratio 1.0 1.0 - 2.0   VITAMIN D, 25-HYDROXY    Collection Time: 01/02/19  9:55 AM   Result Value Ref Range    25-Hydroxyvitamin D (Total) 54.6 30.0 - 100.0 ng/mL   CBC W/ DIFFERENTIAL    Collection Time: 01/02/19  9:55 AM   Result Value Ref Range    WB months (around 5/2/2019) for MD. Isha Beebe M.D.     [id number]    [address], [address]    1/2/2019

## 2019-01-02 ENCOUNTER — TELEPHONE (OUTPATIENT)
Dept: HEMATOLOGY/ONCOLOGY | Facility: HOSPITAL | Age: 69
End: 2019-01-02

## 2019-01-02 ENCOUNTER — OFFICE VISIT (OUTPATIENT)
Dept: HEMATOLOGY/ONCOLOGY | Facility: HOSPITAL | Age: 69
End: 2019-01-02
Attending: GENETIC COUNSELOR, MS
Payer: MEDICARE

## 2019-01-02 VITALS
OXYGEN SATURATION: 99 % | SYSTOLIC BLOOD PRESSURE: 137 MMHG | TEMPERATURE: 97 F | DIASTOLIC BLOOD PRESSURE: 70 MMHG | HEIGHT: 68.5 IN | HEART RATE: 72 BPM | WEIGHT: 167.5 LBS | BODY MASS INDEX: 25.1 KG/M2

## 2019-01-02 DIAGNOSIS — M85.80 OSTEOPENIA, UNSPECIFIED LOCATION: ICD-10-CM

## 2019-01-02 DIAGNOSIS — C50.911 BILATERAL MALIGNANT NEOPLASM OF BREAST IN FEMALE, ESTROGEN RECEPTOR POSITIVE, UNSPECIFIED SITE OF BREAST (HCC): ICD-10-CM

## 2019-01-02 DIAGNOSIS — C50.912 BILATERAL MALIGNANT NEOPLASM OF BREAST IN FEMALE, ESTROGEN RECEPTOR POSITIVE, UNSPECIFIED SITE OF BREAST (HCC): ICD-10-CM

## 2019-01-02 DIAGNOSIS — M89.9 BONE DISORDER: ICD-10-CM

## 2019-01-02 DIAGNOSIS — Z17.0 BILATERAL MALIGNANT NEOPLASM OF BREAST IN FEMALE, ESTROGEN RECEPTOR POSITIVE, UNSPECIFIED SITE OF BREAST (HCC): ICD-10-CM

## 2019-01-02 DIAGNOSIS — T50.905A ADVERSE EFFECT OF DRUG, INITIAL ENCOUNTER: Primary | ICD-10-CM

## 2019-01-02 LAB
ALBUMIN SERPL-MCNC: 3.7 G/DL (ref 3.1–4.5)
ALBUMIN/GLOB SERPL: 1 {RATIO} (ref 1–2)
ALP LIVER SERPL-CCNC: 40 U/L (ref 55–142)
ALT SERPL-CCNC: 18 U/L (ref 14–54)
ANION GAP SERPL CALC-SCNC: 7 MMOL/L (ref 0–18)
AST SERPL-CCNC: 22 U/L (ref 15–41)
BASOPHILS # BLD AUTO: 0.02 X10(3) UL (ref 0–0.1)
BASOPHILS NFR BLD AUTO: 0.4 %
BILIRUB SERPL-MCNC: 0.4 MG/DL (ref 0.1–2)
BUN BLD-MCNC: 17 MG/DL (ref 8–20)
BUN/CREAT SERPL: 21.3 (ref 10–20)
CALCIUM BLD-MCNC: 9.6 MG/DL (ref 8.3–10.3)
CHLORIDE SERPL-SCNC: 107 MMOL/L (ref 101–111)
CO2 SERPL-SCNC: 26 MMOL/L (ref 22–32)
CREAT BLD-MCNC: 0.8 MG/DL (ref 0.55–1.02)
EOSINOPHIL # BLD AUTO: 0.09 X10(3) UL (ref 0–0.3)
EOSINOPHIL NFR BLD AUTO: 1.8 %
ERYTHROCYTE [DISTWIDTH] IN BLOOD BY AUTOMATED COUNT: 12.4 % (ref 11.5–16)
GLOBULIN PLAS-MCNC: 3.8 G/DL (ref 2.8–4.4)
GLUCOSE BLD-MCNC: 91 MG/DL (ref 70–99)
HCT VFR BLD AUTO: 41.4 % (ref 34–50)
HGB BLD-MCNC: 13.8 G/DL (ref 12–16)
IMMATURE GRANULOCYTE COUNT: 0.02 X10(3) UL (ref 0–1)
IMMATURE GRANULOCYTE RATIO %: 0.4 %
LYMPHOCYTES # BLD AUTO: 2.44 X10(3) UL (ref 0.9–4)
LYMPHOCYTES NFR BLD AUTO: 47.7 %
M PROTEIN MFR SERPL ELPH: 7.5 G/DL (ref 6.4–8.2)
MCH RBC QN AUTO: 29.8 PG (ref 27–33.2)
MCHC RBC AUTO-ENTMCNC: 33.3 G/DL (ref 31–37)
MCV RBC AUTO: 89.4 FL (ref 81–100)
MONOCYTES # BLD AUTO: 0.32 X10(3) UL (ref 0.1–1)
MONOCYTES NFR BLD AUTO: 6.3 %
NEUTROPHIL ABS PRELIM: 2.23 X10 (3) UL (ref 1.3–6.7)
NEUTROPHILS # BLD AUTO: 2.23 X10(3) UL (ref 1.3–6.7)
NEUTROPHILS NFR BLD AUTO: 43.4 %
OSMOLALITY SERPL CALC.SUM OF ELEC: 291 MOSM/KG (ref 275–295)
PLATELET # BLD AUTO: 247 10(3)UL (ref 150–450)
POTASSIUM SERPL-SCNC: 4.2 MMOL/L (ref 3.6–5.1)
RBC # BLD AUTO: 4.63 X10(6)UL (ref 3.8–5.1)
RED CELL DISTRIBUTION WIDTH-SD: 40.4 FL (ref 35.1–46.3)
SODIUM SERPL-SCNC: 140 MMOL/L (ref 136–144)
VIT D+METAB SERPL-MCNC: 54.6 NG/ML (ref 30–100)
WBC # BLD AUTO: 5.1 X10(3) UL (ref 4–13)

## 2019-01-02 PROCEDURE — 99214 OFFICE O/P EST MOD 30 MIN: CPT | Performed by: INTERNAL MEDICINE

## 2019-01-02 NOTE — TELEPHONE ENCOUNTER
Shashi Duong MD  [facility] [address]             Call, all labs ok      Pt made aware and verbalized understanding. Copy of lab results and bone density scan printed and ready for pt to  at .

## 2019-01-02 NOTE — PROGRESS NOTES
MD f/u for breast ca. Continues on letrozole without issue or complaint.      Outpatient Oncology Care Plan  Problem list:  knowledge deficit    Problems related to:    disease/disease progression  side effect of treatment    Interventions:  provided genera

## 2019-02-07 RX ORDER — LETROZOLE 2.5 MG/1
TABLET, FILM COATED ORAL
Qty: 30 TABLET | Refills: 2 | Status: SHIPPED | OUTPATIENT
Start: 2019-02-07 | End: 2019-05-02

## 2019-05-02 RX ORDER — LETROZOLE 2.5 MG/1
TABLET, FILM COATED ORAL
Qty: 30 TABLET | Refills: 0 | Status: SHIPPED | OUTPATIENT
Start: 2019-05-02 | End: 2019-05-06

## 2019-05-02 NOTE — PROGRESS NOTES
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     HPI:     76year old HALLUCINATION    MEDICATIONS:      Current Outpatient Medications:  LETROZOLE 2.5 MG Oral Tab TAKE 1 TABLET BY MOUTH EVERY DAY Disp: 30 tablet Rfl: 0   atenolol 50 MG Oral Tab Take 25 mg by mouth nightly.  Disp:  Rfl:    atorvastatin (LIPITOR) 10 MG Oral Ta Tumor 100% ER/IN positive, HER-2 negative, Ki-67 12%. Path left-invasive carcinoma measuring 4 mm, 5 mm. Initial biopsy left side 9 mm. Lymph nodes negative. Tumor 100% ER/IN positive, HER-2 negative, Ki-67 13%.   Declined Oncotype DX testing and start no anesthesia administered

## 2019-05-03 ENCOUNTER — OFFICE VISIT (OUTPATIENT)
Dept: HEMATOLOGY/ONCOLOGY | Facility: HOSPITAL | Age: 69
End: 2019-05-03
Attending: GENETIC COUNSELOR, MS
Payer: MEDICARE

## 2019-05-03 VITALS
RESPIRATION RATE: 18 BRPM | WEIGHT: 173 LBS | HEIGHT: 68.5 IN | DIASTOLIC BLOOD PRESSURE: 106 MMHG | SYSTOLIC BLOOD PRESSURE: 192 MMHG | TEMPERATURE: 98 F | BODY MASS INDEX: 25.92 KG/M2 | OXYGEN SATURATION: 99 % | HEART RATE: 65 BPM

## 2019-05-03 DIAGNOSIS — C50.911 BILATERAL MALIGNANT NEOPLASM OF BREAST IN FEMALE, ESTROGEN RECEPTOR POSITIVE, UNSPECIFIED SITE OF BREAST (HCC): Primary | ICD-10-CM

## 2019-05-03 DIAGNOSIS — Z17.0 BILATERAL MALIGNANT NEOPLASM OF BREAST IN FEMALE, ESTROGEN RECEPTOR POSITIVE, UNSPECIFIED SITE OF BREAST (HCC): Primary | ICD-10-CM

## 2019-05-03 DIAGNOSIS — M89.9 BONE DISORDER: ICD-10-CM

## 2019-05-03 DIAGNOSIS — C50.912 BILATERAL MALIGNANT NEOPLASM OF BREAST IN FEMALE, ESTROGEN RECEPTOR POSITIVE, UNSPECIFIED SITE OF BREAST (HCC): Primary | ICD-10-CM

## 2019-05-03 PROCEDURE — 99213 OFFICE O/P EST LOW 20 MIN: CPT | Performed by: INTERNAL MEDICINE

## 2019-05-06 RX ORDER — LETROZOLE 2.5 MG/1
2.5 TABLET, FILM COATED ORAL
Qty: 90 TABLET | Refills: 3 | Status: SHIPPED | OUTPATIENT
Start: 2019-05-06 | End: 2020-06-08

## 2019-06-17 ENCOUNTER — TELEPHONE (OUTPATIENT)
Dept: HEMATOLOGY/ONCOLOGY | Facility: HOSPITAL | Age: 69
End: 2019-06-17

## 2019-06-17 NOTE — TELEPHONE ENCOUNTER
Calling for refill for letrozole, called and notified pt that refill was sent on 5/6. Pt will call [facility] to check on status.

## 2019-06-28 NOTE — PROGRESS NOTES
Went to see patient in hospital s/p B mastectomy. Patient is doing well, tolerating oral pain medication only. Prescription written by Dr. Latisha Adams, she will fill this at her home pharmacy. Patient is eating and drinking well, denies nausea.  Reviewed the con yes

## 2019-08-19 PROBLEM — H52.13 MYOPIA, BILATERAL: Status: ACTIVE | Noted: 2019-08-19

## 2019-08-19 PROBLEM — H31.091 OTHER CHORIORETINAL SCARS, RIGHT EYE: Status: ACTIVE | Noted: 2019-08-19

## 2019-08-19 PROBLEM — H02.89 OTHER SPECIFIED DISORDERS OF EYELID: Status: ACTIVE | Noted: 2019-08-19

## 2019-08-19 PROBLEM — H52.4 PRESBYOPIA: Status: ACTIVE | Noted: 2019-08-19

## 2019-08-19 PROBLEM — D31.32 BENIGN NEOPLASM OF LEFT CHOROID: Status: ACTIVE | Noted: 2019-08-19

## 2019-08-20 NOTE — PROGRESS NOTES
Breast Surgery Surveillance Visit    Diagnosis: Bilateral breast cancer status post bilateral mastectomies with bilateral sentinel lymph node biopsies without reconstruction on August 16, 2018.     Stage: Cancer Staging  Bilateral malignant neoplasm of loan complication August 16, 3146. She denies any palpable masses, skin changes, nipple discharge or axillary symptoms. She has full range of motion of both upper extremities. She is here today for evaluation and recommendations for further therapy.         Past Visit:  letrozole 2.5 MG Oral Tab Take 1 tablet (2.5 mg total) by mouth once daily. Disp: 90 tablet Rfl: 3   atenolol 50 MG Oral Tab Take 25 mg by mouth nightly. Disp:  Rfl:    atorvastatin (LIPITOR) 10 MG Oral Tab Take 10 mg by mouth nightly.  Disp:  Rfl: wheezing, difficulty in breathing with exertion, emphysema, chronic bronchitis, shortness of breath or abnormal sound when breathing. Cardiovascular:   There is no history of chest pain, chest pressure/discomfort, palpitations, irregular heartbeat, dennis anaphylaxis. /85 (BP Location: Left arm, Patient Position: Sitting, Cuff Size: adult)   Pulse 72   Temp 98 °F (36.7 °C)   Resp 18   Ht 1.753 m (5' 9\")   Wt 79.4 kg (175 lb)   SpO2 98%   BMI 25.84 kg/m²     Physical Examination:      This is a well health. This encounter lasted a total of 25 minutes, more than 50% of which was dedicated to the discussion of management options.

## 2019-08-26 NOTE — PROGRESS NOTES
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     HPI:     71year old HALLUCINATION    MEDICATIONS:      Current Outpatient Medications:  letrozole 2.5 MG Oral Tab Take 1 tablet (2.5 mg total) by mouth once daily. Disp: 90 tablet Rfl: 3   atenolol 50 MG Oral Tab Take 25 mg by mouth nightly.  Disp:  Rfl:    atorvastatin (LIPIT Absolute 2.47 1.50 - 7.70 x10(3) uL    Lymphocyte Absolute 2.62 1.00 - 4.00 x10(3) uL    Monocyte Absolute 0.27 0.10 - 1.00 x10(3) uL    Eosinophil Absolute 0.06 0.00 - 0.70 x10(3) uL    Basophil Absolute 0.02 0.00 - 0.20 x10(3) uL    Immature Granulocyte

## 2019-08-27 ENCOUNTER — TELEPHONE (OUTPATIENT)
Dept: HEMATOLOGY/ONCOLOGY | Facility: HOSPITAL | Age: 69
End: 2019-08-27

## 2019-08-27 ENCOUNTER — OFFICE VISIT (OUTPATIENT)
Dept: HEMATOLOGY/ONCOLOGY | Facility: HOSPITAL | Age: 69
End: 2019-08-27
Attending: GENETIC COUNSELOR, MS
Payer: MEDICARE

## 2019-08-27 ENCOUNTER — OFFICE VISIT (OUTPATIENT)
Dept: SURGERY | Facility: CLINIC | Age: 69
End: 2019-08-27
Payer: MEDICARE

## 2019-08-27 VITALS
RESPIRATION RATE: 18 BRPM | HEIGHT: 69 IN | BODY MASS INDEX: 25.92 KG/M2 | HEART RATE: 72 BPM | DIASTOLIC BLOOD PRESSURE: 77 MMHG | OXYGEN SATURATION: 98 % | SYSTOLIC BLOOD PRESSURE: 123 MMHG | WEIGHT: 175 LBS | TEMPERATURE: 98 F

## 2019-08-27 VITALS
OXYGEN SATURATION: 98 % | BODY MASS INDEX: 25.92 KG/M2 | RESPIRATION RATE: 18 BRPM | WEIGHT: 175 LBS | HEIGHT: 69 IN | DIASTOLIC BLOOD PRESSURE: 85 MMHG | TEMPERATURE: 98 F | SYSTOLIC BLOOD PRESSURE: 127 MMHG | HEART RATE: 72 BPM

## 2019-08-27 DIAGNOSIS — C50.911 BILATERAL MALIGNANT NEOPLASM OF BREAST IN FEMALE, ESTROGEN RECEPTOR POSITIVE, UNSPECIFIED SITE OF BREAST (HCC): Primary | ICD-10-CM

## 2019-08-27 DIAGNOSIS — C50.912 BILATERAL MALIGNANT NEOPLASM OF BREAST IN FEMALE, ESTROGEN RECEPTOR POSITIVE, UNSPECIFIED SITE OF BREAST (HCC): Primary | ICD-10-CM

## 2019-08-27 DIAGNOSIS — M85.80 OSTEOPENIA, UNSPECIFIED LOCATION: Primary | ICD-10-CM

## 2019-08-27 DIAGNOSIS — Z17.0 BILATERAL MALIGNANT NEOPLASM OF BREAST IN FEMALE, ESTROGEN RECEPTOR POSITIVE, UNSPECIFIED SITE OF BREAST (HCC): Primary | ICD-10-CM

## 2019-08-27 DIAGNOSIS — C50.911 BILATERAL MALIGNANT NEOPLASM OF BREAST IN FEMALE, ESTROGEN RECEPTOR POSITIVE, UNSPECIFIED SITE OF BREAST (HCC): ICD-10-CM

## 2019-08-27 DIAGNOSIS — M89.9 BONE DISORDER: ICD-10-CM

## 2019-08-27 DIAGNOSIS — C50.912 BILATERAL MALIGNANT NEOPLASM OF BREAST IN FEMALE, ESTROGEN RECEPTOR POSITIVE, UNSPECIFIED SITE OF BREAST (HCC): ICD-10-CM

## 2019-08-27 DIAGNOSIS — Z90.13 S/P BILATERAL MASTECTOMY: ICD-10-CM

## 2019-08-27 DIAGNOSIS — Z17.0 BILATERAL MALIGNANT NEOPLASM OF BREAST IN FEMALE, ESTROGEN RECEPTOR POSITIVE, UNSPECIFIED SITE OF BREAST (HCC): ICD-10-CM

## 2019-08-27 LAB
ALBUMIN SERPL-MCNC: 4.2 G/DL (ref 3.4–5)
ALBUMIN/GLOB SERPL: 1.2 {RATIO} (ref 1–2)
ALP LIVER SERPL-CCNC: 48 U/L (ref 55–142)
ALT SERPL-CCNC: 31 U/L (ref 13–56)
ANION GAP SERPL CALC-SCNC: 3 MMOL/L (ref 0–18)
AST SERPL-CCNC: 28 U/L (ref 15–37)
BASOPHILS # BLD AUTO: 0.02 X10(3) UL (ref 0–0.2)
BASOPHILS NFR BLD AUTO: 0.4 %
BILIRUB SERPL-MCNC: 0.6 MG/DL (ref 0.1–2)
BUN BLD-MCNC: 17 MG/DL (ref 7–18)
BUN/CREAT SERPL: 21.3 (ref 10–20)
CALCIUM BLD-MCNC: 9.6 MG/DL (ref 8.5–10.1)
CHLORIDE SERPL-SCNC: 110 MMOL/L (ref 98–112)
CO2 SERPL-SCNC: 27 MMOL/L (ref 21–32)
CREAT BLD-MCNC: 0.8 MG/DL (ref 0.55–1.02)
DEPRECATED RDW RBC AUTO: 41 FL (ref 35.1–46.3)
EOSINOPHIL # BLD AUTO: 0.06 X10(3) UL (ref 0–0.7)
EOSINOPHIL NFR BLD AUTO: 1.1 %
ERYTHROCYTE [DISTWIDTH] IN BLOOD BY AUTOMATED COUNT: 12.3 % (ref 11–15)
GLOBULIN PLAS-MCNC: 3.6 G/DL (ref 2.8–4.4)
GLUCOSE BLD-MCNC: 88 MG/DL (ref 70–99)
HCT VFR BLD AUTO: 43.9 % (ref 35–48)
HGB BLD-MCNC: 14.8 G/DL (ref 12–16)
IMM GRANULOCYTES # BLD AUTO: 0 X10(3) UL (ref 0–1)
IMM GRANULOCYTES NFR BLD: 0 %
LYMPHOCYTES # BLD AUTO: 2.62 X10(3) UL (ref 1–4)
LYMPHOCYTES NFR BLD AUTO: 48.2 %
M PROTEIN MFR SERPL ELPH: 7.8 G/DL (ref 6.4–8.2)
MCH RBC QN AUTO: 30.7 PG (ref 26–34)
MCHC RBC AUTO-ENTMCNC: 33.7 G/DL (ref 31–37)
MCV RBC AUTO: 91.1 FL (ref 80–100)
MONOCYTES # BLD AUTO: 0.27 X10(3) UL (ref 0.1–1)
MONOCYTES NFR BLD AUTO: 5 %
NEUTROPHILS # BLD AUTO: 2.47 X10 (3) UL (ref 1.5–7.7)
NEUTROPHILS # BLD AUTO: 2.47 X10(3) UL (ref 1.5–7.7)
NEUTROPHILS NFR BLD AUTO: 45.3 %
OSMOLALITY SERPL CALC.SUM OF ELEC: 291 MOSM/KG (ref 275–295)
PLATELET # BLD AUTO: 210 10(3)UL (ref 150–450)
RBC # BLD AUTO: 4.82 X10(6)UL (ref 3.8–5.3)
SODIUM SERPL-SCNC: 140 MMOL/L (ref 136–145)
VIT D+METAB SERPL-MCNC: 43.9 NG/ML (ref 30–100)
WBC # BLD AUTO: 5.4 X10(3) UL (ref 4–11)

## 2019-08-27 PROCEDURE — 99214 OFFICE O/P EST MOD 30 MIN: CPT | Performed by: INTERNAL MEDICINE

## 2019-08-27 PROCEDURE — 99214 OFFICE O/P EST MOD 30 MIN: CPT | Performed by: SURGERY

## 2019-08-27 NOTE — TELEPHONE ENCOUNTER
Brian Lynn MD  [address]             Call, all labs including vit d ok      Spoke to pt, verbalized understanding.

## 2019-08-27 NOTE — PROGRESS NOTES
Outpatient Oncology Care Plan  Problem list:  weight gain, occasional HF    Problems related to:    side effect of treatment    Interventions:  provided general teaching    Expected outcomes:  understands plan of care    Progress towards outcome:  making p

## 2019-09-12 ENCOUNTER — OFFICE VISIT (OUTPATIENT)
Dept: HEMATOLOGY/ONCOLOGY | Facility: HOSPITAL | Age: 69
End: 2019-09-12
Attending: GENETIC COUNSELOR, MS
Payer: MEDICARE

## 2019-09-12 DIAGNOSIS — Z85.3 PERSONAL HISTORY OF BREAST CANCER: Primary | ICD-10-CM

## 2019-09-12 DIAGNOSIS — Z08 ENCOUNTER FOR FOLLOW-UP EXAMINATION AFTER COMPLETED TREATMENT FOR MALIGNANT NEOPLASM: ICD-10-CM

## 2019-09-12 DIAGNOSIS — Z71.9 COUNSELING, UNSPECIFIED: ICD-10-CM

## 2019-09-12 PROCEDURE — 99215 OFFICE O/P EST HI 40 MIN: CPT | Performed by: NURSE PRACTITIONER

## 2019-09-12 NOTE — PROGRESS NOTES
I met with Abdifatah Mcgee for a Survivorship Clinic visit to provide a survivorship care plan (SCP) and information related to post-treatment care. She came to the visit alone.  She has a diagnosis of bilateral breast cancers, and and was treated with byron previous melanoma diagnosis. She is up-to-date with colonoscopy screening. Reviewed concerning symptoms that she should report to any Provider. Reviewed possible late and long-term effects related to the treatment that was received.   She has no i spent counseling regarding survivorship education and all of the information contained in the Oncology Treatment Summary SCP and in the additional resources provided.   MAIRA Tyler

## 2020-02-24 NOTE — PROGRESS NOTES
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     HPI:     71year old HALLUCINATION  Vicodin [Hydrocodon*    HALLUCINATION    MEDICATIONS:    Current Outpatient Medications   Medication Sig Dispense Refill   • atorvastatin 20 MG Oral Tab      • letrozole 2.5 MG Oral Tab Take 1 tablet (2.5 mg total) by mouth once daily.  90 ta LN/margins negative. Tumor 100% ER/NM p, HER-2 negative, Ki-67 12%. Path L-invasive carcinoma measuring 4 mm, 5 mm. Initial biopsy left side 9 mm. LN negative. Tumor 100% ER/NM positive, HER-2 negative, Ki-67 13%.   Declined Oncotype DX testing and star

## 2020-02-25 ENCOUNTER — OFFICE VISIT (OUTPATIENT)
Dept: HEMATOLOGY/ONCOLOGY | Facility: HOSPITAL | Age: 70
End: 2020-02-25
Attending: GENETIC COUNSELOR, MS
Payer: MEDICARE

## 2020-02-25 VITALS
SYSTOLIC BLOOD PRESSURE: 143 MMHG | DIASTOLIC BLOOD PRESSURE: 74 MMHG | OXYGEN SATURATION: 98 % | HEART RATE: 60 BPM | TEMPERATURE: 98 F | BODY MASS INDEX: 25.62 KG/M2 | RESPIRATION RATE: 18 BRPM | HEIGHT: 69.02 IN | WEIGHT: 173 LBS

## 2020-02-25 DIAGNOSIS — C50.912 BILATERAL MALIGNANT NEOPLASM OF BREAST IN FEMALE, ESTROGEN RECEPTOR POSITIVE, UNSPECIFIED SITE OF BREAST (HCC): Primary | ICD-10-CM

## 2020-02-25 DIAGNOSIS — Z17.0 BILATERAL MALIGNANT NEOPLASM OF BREAST IN FEMALE, ESTROGEN RECEPTOR POSITIVE, UNSPECIFIED SITE OF BREAST (HCC): Primary | ICD-10-CM

## 2020-02-25 DIAGNOSIS — M89.9 BONE DISORDER: ICD-10-CM

## 2020-02-25 DIAGNOSIS — C50.911 BILATERAL MALIGNANT NEOPLASM OF BREAST IN FEMALE, ESTROGEN RECEPTOR POSITIVE, UNSPECIFIED SITE OF BREAST (HCC): Primary | ICD-10-CM

## 2020-02-25 PROCEDURE — 99213 OFFICE O/P EST LOW 20 MIN: CPT | Performed by: INTERNAL MEDICINE

## 2020-02-25 RX ORDER — ATORVASTATIN CALCIUM 20 MG/1
TABLET, FILM COATED ORAL
COMMUNITY
Start: 2020-01-06

## 2020-02-25 RX ORDER — ATENOLOL 25 MG/1
TABLET ORAL
COMMUNITY
End: 2020-08-26 | Stop reason: ALTCHOICE

## 2020-02-25 RX ORDER — IVERMECTIN 10 MG/G
CREAM TOPICAL
COMMUNITY
Start: 2019-12-18 | End: 2021-02-24 | Stop reason: ALTCHOICE

## 2020-02-25 NOTE — PROGRESS NOTES
6 month MD f/u for byron breast ca. S/p byron mastectomy. Continues on letrozole daily. Reports that hot flashes are more mild now. Denies any issues or concerns.      Education Record    Learner:  Patient    Barriers / Limitations:  None   Comments:    Method:

## 2020-06-08 RX ORDER — LETROZOLE 2.5 MG/1
TABLET, FILM COATED ORAL
Qty: 90 TABLET | Refills: 3 | Status: SHIPPED | OUTPATIENT
Start: 2020-06-08 | End: 2021-06-07

## 2020-08-25 NOTE — PROGRESS NOTES
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     HPI:     79year old HALLUCINATION    MEDICATIONS:    Current Outpatient Medications   Medication Sig Dispense Refill   • LETROZOLE 2.5 MG Oral Tab TAKE 1 TABLET BY MOUTH EVERY DAY 90 tablet 3   • SOOLANTRA 1 % External Cream      • atorvastatin 20 MG Oral Tab      • atenolol HER-2 negative, Ki-67 12%. Path L-invasive carcinoma measuring 4 mm, 5 mm. Initial biopsy left side 9 mm. LN negative. Tumor 100% ER/RI positive, HER-2 negative, Ki-67 13%.   Declined Oncotype DX testing and started letrozole in 9/18, continue till 9/23

## 2020-08-26 ENCOUNTER — OFFICE VISIT (OUTPATIENT)
Dept: HEMATOLOGY/ONCOLOGY | Facility: HOSPITAL | Age: 70
End: 2020-08-26
Attending: GENETIC COUNSELOR, MS
Payer: MEDICARE

## 2020-08-26 VITALS
RESPIRATION RATE: 18 BRPM | WEIGHT: 174 LBS | SYSTOLIC BLOOD PRESSURE: 154 MMHG | BODY MASS INDEX: 25.77 KG/M2 | TEMPERATURE: 98 F | HEIGHT: 69.02 IN | HEART RATE: 74 BPM | DIASTOLIC BLOOD PRESSURE: 70 MMHG | OXYGEN SATURATION: 99 %

## 2020-08-26 DIAGNOSIS — M89.9 BONE DISORDER: ICD-10-CM

## 2020-08-26 DIAGNOSIS — C50.912 BILATERAL MALIGNANT NEOPLASM OF BREAST IN FEMALE, ESTROGEN RECEPTOR POSITIVE, UNSPECIFIED SITE OF BREAST (HCC): ICD-10-CM

## 2020-08-26 DIAGNOSIS — Z17.0 BILATERAL MALIGNANT NEOPLASM OF BREAST IN FEMALE, ESTROGEN RECEPTOR POSITIVE, UNSPECIFIED SITE OF BREAST (HCC): ICD-10-CM

## 2020-08-26 DIAGNOSIS — Z78.0 POSTMENOPAUSAL: Primary | ICD-10-CM

## 2020-08-26 DIAGNOSIS — C50.911 BILATERAL MALIGNANT NEOPLASM OF BREAST IN FEMALE, ESTROGEN RECEPTOR POSITIVE, UNSPECIFIED SITE OF BREAST (HCC): ICD-10-CM

## 2020-08-26 PROCEDURE — 99213 OFFICE O/P EST LOW 20 MIN: CPT | Performed by: INTERNAL MEDICINE

## 2020-08-26 NOTE — PROGRESS NOTES
Routine MD visit. Tolerating letrozole. Less hot flashes. Maybe a little hair thinning.      Education Record    Learner:  Patient    Disease / Laurie Garvin of care    Barriers / Limitations:  None   Comments:    Method:  Discussion   Comments:    Gener

## 2021-02-24 ENCOUNTER — HOSPITAL ENCOUNTER (OUTPATIENT)
Dept: BONE DENSITY | Age: 71
Discharge: HOME | End: 2021-02-24
Attending: INTERNAL MEDICINE
Payer: MEDICARE

## 2021-02-24 ENCOUNTER — OFFICE VISIT (OUTPATIENT)
Dept: HEMATOLOGY/ONCOLOGY | Facility: HOSPITAL | Age: 71
End: 2021-02-24
Attending: GENETIC COUNSELOR, MS
Payer: MEDICARE

## 2021-02-24 VITALS
SYSTOLIC BLOOD PRESSURE: 129 MMHG | TEMPERATURE: 98 F | HEIGHT: 69.02 IN | WEIGHT: 176 LBS | DIASTOLIC BLOOD PRESSURE: 80 MMHG | BODY MASS INDEX: 26.07 KG/M2 | RESPIRATION RATE: 16 BRPM | OXYGEN SATURATION: 96 % | HEART RATE: 86 BPM

## 2021-02-24 DIAGNOSIS — M89.9 BONE DISORDER: ICD-10-CM

## 2021-02-24 DIAGNOSIS — Z17.0 BILATERAL MALIGNANT NEOPLASM OF BREAST IN FEMALE, ESTROGEN RECEPTOR POSITIVE, UNSPECIFIED SITE OF BREAST (HCC): Primary | ICD-10-CM

## 2021-02-24 DIAGNOSIS — M85.80 OSTEOPENIA, UNSPECIFIED LOCATION: ICD-10-CM

## 2021-02-24 DIAGNOSIS — C50.912 BILATERAL MALIGNANT NEOPLASM OF BREAST IN FEMALE, ESTROGEN RECEPTOR POSITIVE, UNSPECIFIED SITE OF BREAST (HCC): Primary | ICD-10-CM

## 2021-02-24 DIAGNOSIS — C50.911 BILATERAL MALIGNANT NEOPLASM OF BREAST IN FEMALE, ESTROGEN RECEPTOR POSITIVE, UNSPECIFIED SITE OF BREAST (HCC): Primary | ICD-10-CM

## 2021-02-24 DIAGNOSIS — Z78.0 POSTMENOPAUSAL: ICD-10-CM

## 2021-02-24 PROCEDURE — 99213 OFFICE O/P EST LOW 20 MIN: CPT | Performed by: INTERNAL MEDICINE

## 2021-02-24 PROCEDURE — 77080 DXA BONE DENSITY AXIAL: CPT | Performed by: INTERNAL MEDICINE

## 2021-02-24 NOTE — PROGRESS NOTES
Routine MD follow up for breast cancer. Taking letrozole.    Reports occ trouble sleeping,  Education Record    Learner:  Patient    Disease / Jung Tellez of care    Barriers / Limitations:  None   Comments:    Method:  Discussion   Comments:    Pj Serrato

## 2021-02-24 NOTE — PROGRESS NOTES
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     HPI:     79 year o HALLUCINATION  Vicodin [Hydrocodon*    HALLUCINATION    MEDICATIONS:    Current Outpatient Medications   Medication Sig Dispense Refill   • LETROZOLE 2.5 MG Oral Tab TAKE 1 TABLET BY MOUTH EVERY DAY 90 tablet 3   • atorvastatin 20 MG Oral Tab      • a ER/ID, HER-2 negative, Ki-67 12%. Path L-invasive carcinoma measuring 4 mm, 5 mm. Initial biopsy left side 9 mm. LN negative. Tumor 100% ER/ID, HER-2 negative, Ki-67 13%. Declined Oncotype DX testing and started letrozole in 9/18, continue till 9/23.

## 2021-03-09 DIAGNOSIS — Z23 NEED FOR VACCINATION: ICD-10-CM

## 2021-04-29 ENCOUNTER — TELEPHONE (OUTPATIENT)
Dept: HEMATOLOGY/ONCOLOGY | Facility: HOSPITAL | Age: 71
End: 2021-04-29

## 2021-04-29 DIAGNOSIS — Z17.0 BILATERAL MALIGNANT NEOPLASM OF BREAST IN FEMALE, ESTROGEN RECEPTOR POSITIVE, UNSPECIFIED SITE OF BREAST (HCC): Primary | ICD-10-CM

## 2021-04-29 DIAGNOSIS — Z90.13 H/O BILATERAL MASTECTOMY: ICD-10-CM

## 2021-04-29 DIAGNOSIS — C50.912 BILATERAL MALIGNANT NEOPLASM OF BREAST IN FEMALE, ESTROGEN RECEPTOR POSITIVE, UNSPECIFIED SITE OF BREAST (HCC): Primary | ICD-10-CM

## 2021-04-29 DIAGNOSIS — C50.911 BILATERAL MALIGNANT NEOPLASM OF BREAST IN FEMALE, ESTROGEN RECEPTOR POSITIVE, UNSPECIFIED SITE OF BREAST (HCC): Primary | ICD-10-CM

## 2021-04-29 NOTE — TELEPHONE ENCOUNTER
Patsy Linder calling she needs order for 3 bras to go to [facility] [phone]. She has appt 5/5.  Thank you guerita

## 2021-06-07 RX ORDER — LETROZOLE 2.5 MG/1
TABLET, FILM COATED ORAL
Qty: 90 TABLET | Refills: 3 | Status: SHIPPED | OUTPATIENT
Start: 2021-06-07 | End: 2022-02-22

## 2021-08-23 NOTE — PROGRESS NOTES
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     HPI:     70 year o Medication Sig Dispense Refill   • LETROZOLE 2.5 MG Oral Tab TAKE 1 TABLET BY MOUTH EVERY DAY 90 tablet 3   • atorvastatin 20 MG Oral Tab      • atenolol 50 MG Oral Tab Take 25 mg by mouth nightly.      • Cinnamon 500 MG Oral Cap Take 500 mg by mouth brooke continue till 9/23. Tolerating treatment well. Not candidate for mammograms given mastectomy status. # Osteopenia: DEXA 2/21 showed osteopenia with T score -1.2, repeat 2/23.       ORDERS PLACED:    Return in about 6 months (around 2/25/2022) for MD vi

## 2021-08-25 ENCOUNTER — OFFICE VISIT (OUTPATIENT)
Dept: HEMATOLOGY/ONCOLOGY | Facility: HOSPITAL | Age: 71
End: 2021-08-25
Attending: GENETIC COUNSELOR, MS
Payer: MEDICARE

## 2021-08-25 VITALS
BODY MASS INDEX: 24.11 KG/M2 | WEIGHT: 162.81 LBS | OXYGEN SATURATION: 99 % | DIASTOLIC BLOOD PRESSURE: 80 MMHG | TEMPERATURE: 98 F | SYSTOLIC BLOOD PRESSURE: 148 MMHG | HEART RATE: 74 BPM | RESPIRATION RATE: 16 BRPM | HEIGHT: 69.02 IN

## 2021-08-25 DIAGNOSIS — Z17.0 BILATERAL MALIGNANT NEOPLASM OF BREAST IN FEMALE, ESTROGEN RECEPTOR POSITIVE, UNSPECIFIED SITE OF BREAST (HCC): Primary | ICD-10-CM

## 2021-08-25 DIAGNOSIS — M85.80 OSTEOPENIA, UNSPECIFIED LOCATION: ICD-10-CM

## 2021-08-25 DIAGNOSIS — C50.912 BILATERAL MALIGNANT NEOPLASM OF BREAST IN FEMALE, ESTROGEN RECEPTOR POSITIVE, UNSPECIFIED SITE OF BREAST (HCC): Primary | ICD-10-CM

## 2021-08-25 DIAGNOSIS — C50.911 BILATERAL MALIGNANT NEOPLASM OF BREAST IN FEMALE, ESTROGEN RECEPTOR POSITIVE, UNSPECIFIED SITE OF BREAST (HCC): Primary | ICD-10-CM

## 2021-08-25 PROCEDURE — 99214 OFFICE O/P EST MOD 30 MIN: CPT | Performed by: INTERNAL MEDICINE

## 2022-02-22 ENCOUNTER — OFFICE VISIT (OUTPATIENT)
Dept: HEMATOLOGY/ONCOLOGY | Facility: HOSPITAL | Age: 72
End: 2022-02-22
Attending: GENETIC COUNSELOR, MS
Payer: MEDICARE

## 2022-02-22 VITALS
DIASTOLIC BLOOD PRESSURE: 82 MMHG | BODY MASS INDEX: 24.2 KG/M2 | TEMPERATURE: 98 F | HEIGHT: 69.02 IN | WEIGHT: 163.38 LBS | HEART RATE: 72 BPM | SYSTOLIC BLOOD PRESSURE: 163 MMHG | OXYGEN SATURATION: 99 % | RESPIRATION RATE: 16 BRPM

## 2022-02-22 DIAGNOSIS — M85.80 OSTEOPENIA, UNSPECIFIED LOCATION: ICD-10-CM

## 2022-02-22 DIAGNOSIS — C50.911 BILATERAL MALIGNANT NEOPLASM OF BREAST IN FEMALE, ESTROGEN RECEPTOR POSITIVE, UNSPECIFIED SITE OF BREAST (HCC): Primary | ICD-10-CM

## 2022-02-22 DIAGNOSIS — Z78.0 POSTMENOPAUSAL: ICD-10-CM

## 2022-02-22 DIAGNOSIS — C50.912 BILATERAL MALIGNANT NEOPLASM OF BREAST IN FEMALE, ESTROGEN RECEPTOR POSITIVE, UNSPECIFIED SITE OF BREAST (HCC): Primary | ICD-10-CM

## 2022-02-22 DIAGNOSIS — Z17.0 BILATERAL MALIGNANT NEOPLASM OF BREAST IN FEMALE, ESTROGEN RECEPTOR POSITIVE, UNSPECIFIED SITE OF BREAST (HCC): Primary | ICD-10-CM

## 2022-02-22 PROCEDURE — 99214 OFFICE O/P EST MOD 30 MIN: CPT | Performed by: INTERNAL MEDICINE

## 2022-02-22 RX ORDER — LETROZOLE 2.5 MG/1
2.5 TABLET, FILM COATED ORAL DAILY
Qty: 90 TABLET | Refills: 3 | Status: SHIPPED | OUTPATIENT
Start: 2022-02-22

## 2022-02-22 NOTE — PROGRESS NOTES
Education Record    Learner:  Patient    Disease / Diagnosis: byron breast cancer    Barriers / Limitations:  None   Comments:    Method:  Discussion   Comments:    General Topics:  Plan of care reviewed   Comments:    Outcome:  Shows understanding   Comments:  Taking letrozole. dexa scan up to date. Pt feeling well. Reports hot flashes are less now.

## 2023-01-16 ENCOUNTER — TELEPHONE (OUTPATIENT)
Dept: HEMATOLOGY/ONCOLOGY | Facility: HOSPITAL | Age: 73
End: 2023-01-16

## 2023-01-16 DIAGNOSIS — C50.912 BILATERAL MALIGNANT NEOPLASM OF BREAST IN FEMALE, ESTROGEN RECEPTOR POSITIVE, UNSPECIFIED SITE OF BREAST (HCC): Primary | ICD-10-CM

## 2023-01-16 DIAGNOSIS — Z17.0 BILATERAL MALIGNANT NEOPLASM OF BREAST IN FEMALE, ESTROGEN RECEPTOR POSITIVE, UNSPECIFIED SITE OF BREAST (HCC): Primary | ICD-10-CM

## 2023-01-16 DIAGNOSIS — M85.80 OSTEOPENIA, UNSPECIFIED LOCATION: ICD-10-CM

## 2023-01-16 DIAGNOSIS — C50.911 BILATERAL MALIGNANT NEOPLASM OF BREAST IN FEMALE, ESTROGEN RECEPTOR POSITIVE, UNSPECIFIED SITE OF BREAST (HCC): Primary | ICD-10-CM

## 2023-01-16 DIAGNOSIS — M89.9 BONE DISORDER: ICD-10-CM

## 2023-01-16 NOTE — TELEPHONE ENCOUNTER
Lucretia Mark is calling order that is in place for bone density  cannot be used even though extended. Needs new order patients  appointment is  03/20/23.

## 2023-02-10 DIAGNOSIS — C50.912 BILATERAL MALIGNANT NEOPLASM OF BREAST IN FEMALE, ESTROGEN RECEPTOR POSITIVE, UNSPECIFIED SITE OF BREAST (HCC): ICD-10-CM

## 2023-02-10 DIAGNOSIS — Z17.0 BILATERAL MALIGNANT NEOPLASM OF BREAST IN FEMALE, ESTROGEN RECEPTOR POSITIVE, UNSPECIFIED SITE OF BREAST (HCC): ICD-10-CM

## 2023-02-10 DIAGNOSIS — C50.911 BILATERAL MALIGNANT NEOPLASM OF BREAST IN FEMALE, ESTROGEN RECEPTOR POSITIVE, UNSPECIFIED SITE OF BREAST (HCC): ICD-10-CM

## 2023-02-10 RX ORDER — LETROZOLE 2.5 MG/1
TABLET, FILM COATED ORAL
Qty: 90 TABLET | Refills: 0 | Status: SHIPPED | OUTPATIENT
Start: 2023-02-10

## 2023-02-13 ENCOUNTER — TELEPHONE (OUTPATIENT)
Dept: HEMATOLOGY/ONCOLOGY | Facility: HOSPITAL | Age: 73
End: 2023-02-13

## 2023-02-13 ENCOUNTER — HOSPITAL ENCOUNTER (OUTPATIENT)
Dept: BONE DENSITY | Age: 73
Discharge: HOME | End: 2023-02-13
Attending: INTERNAL MEDICINE
Payer: MEDICARE

## 2023-02-13 DIAGNOSIS — Z78.0 POSTMENOPAUSAL: ICD-10-CM

## 2023-02-13 PROCEDURE — 77080 DXA BONE DENSITY AXIAL: CPT | Performed by: INTERNAL MEDICINE

## 2023-02-13 NOTE — TELEPHONE ENCOUNTER
Received call from Pt's PCP Dr. Yogi Thompson. Pt scheduled to undergo partial thyroidectomy. History of homozygous factor V Leiden mutation. Discussed that since procedure is ambulatory, there is low risk for VTE and no special precautions are needed. She has had a bilateral mastectomy that was uneventful.   Per PCP, patient has been off of AI for the last 6 months by her own choice

## 2023-03-07 ENCOUNTER — APPOINTMENT (OUTPATIENT)
Dept: HEMATOLOGY/ONCOLOGY | Facility: HOSPITAL | Age: 73
End: 2023-03-07
Attending: GENETIC COUNSELOR, MS
Payer: MEDICARE

## 2023-03-21 ENCOUNTER — OFFICE VISIT (OUTPATIENT)
Dept: HEMATOLOGY/ONCOLOGY | Facility: HOSPITAL | Age: 73
End: 2023-03-21
Attending: INTERNAL MEDICINE
Payer: MEDICARE

## 2023-03-21 VITALS
OXYGEN SATURATION: 98 % | SYSTOLIC BLOOD PRESSURE: 158 MMHG | DIASTOLIC BLOOD PRESSURE: 80 MMHG | RESPIRATION RATE: 16 BRPM | HEART RATE: 69 BPM | BODY MASS INDEX: 24.29 KG/M2 | WEIGHT: 164 LBS | TEMPERATURE: 97 F | HEIGHT: 69.02 IN

## 2023-03-21 DIAGNOSIS — T50.905D ADVERSE EFFECT OF DRUG, SUBSEQUENT ENCOUNTER: ICD-10-CM

## 2023-03-21 DIAGNOSIS — Z17.0 BILATERAL MALIGNANT NEOPLASM OF BREAST IN FEMALE, ESTROGEN RECEPTOR POSITIVE, UNSPECIFIED SITE OF BREAST (HCC): Primary | ICD-10-CM

## 2023-03-21 DIAGNOSIS — C50.912 BILATERAL MALIGNANT NEOPLASM OF BREAST IN FEMALE, ESTROGEN RECEPTOR POSITIVE, UNSPECIFIED SITE OF BREAST (HCC): Primary | ICD-10-CM

## 2023-03-21 DIAGNOSIS — C50.911 BILATERAL MALIGNANT NEOPLASM OF BREAST IN FEMALE, ESTROGEN RECEPTOR POSITIVE, UNSPECIFIED SITE OF BREAST (HCC): Primary | ICD-10-CM

## 2023-03-21 DIAGNOSIS — Z78.0 POSTMENOPAUSAL: ICD-10-CM

## 2023-03-21 PROCEDURE — 99214 OFFICE O/P EST MOD 30 MIN: CPT | Performed by: INTERNAL MEDICINE

## 2023-03-21 NOTE — PROGRESS NOTES
Education Record    Learner:  Patient    Disease / Diagnosis:hx byron breast cancer  Bilateral mastectomy. Barriers / Limitations:  None   Comments:    Method:  Discussion   Comments:    General Topics:  Plan of care reviewed   Comments:    Outcome:  Shows understanding   Comments:  Taking letrozole. Some trouble sleeping worse in the last year. Denies joint pain. Energy level is good.

## 2023-07-19 NOTE — TELEPHONE ENCOUNTER
Pt phoned in with low output from her drains. Pt reports that the left has been low for a few days. The right output today only 17 so far,   Yesterday was 30.    Asking about coming in tomorrow for drain removal.   I call her back to address a time to c Libtayo Pregnancy And Lactation Text: This medication is contraindicated in pregnancy and when breast feeding.

## 2025-09-17 ENCOUNTER — APPOINTMENT (OUTPATIENT)
Dept: PULMONOLOGY | Age: 75
End: 2025-09-17

## (undated) DEVICE — SOL  .9 1000ML BTL

## (undated) DEVICE — STERILE (15.2 X 244CM) POLYETHYLENE TELESCOPICALLY-FOLDED COVER WITH ATTACHED (3CM) NEOGUARD™ TIP: Brand: SURGI-TIP TRANSDUCER COVER

## (undated) DEVICE — STERILE HOOK LOCK LATEX FREE ELASTIC BANDAGE 6INX5YD: Brand: HOOK LOCK™

## (undated) DEVICE — SUTURE ETHILON 3-0 PS-1

## (undated) DEVICE — DRESSING BIOPATCH 1X4 CNTR

## (undated) DEVICE — DRAIN SILICONE FLAT 10MM

## (undated) DEVICE — SUTURE VICRYL 3-0 SH

## (undated) DEVICE — SUTURE ETHILON 3-0 PS-2

## (undated) DEVICE — SYRINGE 5ML LL TIP

## (undated) DEVICE — HEMOCLIP HORIZON MED 002200

## (undated) DEVICE — CLEAR MONOFILAMENT (POLYDIOXANONE), ABSORBABLE SURGICAL SUTURE: Brand: PDS

## (undated) DEVICE — DRAPE PACK CHEST & U BAR

## (undated) DEVICE — BLADE ELECTRODE: Brand: EDGE

## (undated) DEVICE — UNDYED BRAIDED (POLYGLACTIN 910), SYNTHETIC ABSORBABLE SUTURE: Brand: COATED VICRYL

## (undated) DEVICE — SUTURE MONOCRYL 4-0 PS-2

## (undated) DEVICE — UNDERPAD INCNT 30X30IN PP HVY

## (undated) DEVICE — SUTURE SILK 2-0 FSL

## (undated) DEVICE — STANDARD HYPODERMIC NEEDLE,POLYPROPYLENE HUB: Brand: MONOJECT

## (undated) DEVICE — ABDOMINAL PAD: Brand: DERMACEA

## (undated) DEVICE — BREAST-HERNIA-PORT CDS-LF: Brand: MEDLINE INDUSTRIES, INC.

## (undated) DEVICE — CAUTERY BLADE 2IN INS E1455

## (undated) DEVICE — Device: Brand: PLUMEPEN

## (undated) DEVICE — GOWN,SIRUS,FABRIC-REINFORCED,LARGE: Brand: MEDLINE

## (undated) DEVICE — ADHESIVE MASTISOL 2/3CC VL

## (undated) DEVICE — HEMOCLIP HORIZON SM 001200

## (undated) DEVICE — GLOVE SURG TRIUMPH SZ 6-1/2

## (undated) DEVICE — 3M™ TEGADERM™ TRANSPARENT FILM DRESSING, 1626W, 4 IN X 4-3/4 IN (10 CM X 12 CM), 50 EACH/CARTON, 4 CARTON/CASE: Brand: 3M™ TEGADERM™

## (undated) DEVICE — LAPAROTOMY SPONGE - RF AND X-RAY DETECTABLE PRE-WASHED: Brand: SITUATE

## (undated) DEVICE — SUTURE SILK 2-0 FS

## (undated) DEVICE — SUPER SPONGES,MEDIUM: Brand: KERLIX

## (undated) DEVICE — DRAIN RELIAVAC 100CC

## (undated) DEVICE — KENDALL SCD EXPRESS SLEEVES, KNEE LENGTH, MEDIUM: Brand: KENDALL SCD

## (undated) DEVICE — #15 STERILE STAINLESS BLADE: Brand: STERILE STAINLESS BLADES

## (undated) DEVICE — CASED DISP BIPOLAR CORD

## (undated) DEVICE — GAUZE SPONGES,12 PLY: Brand: CURITY

## (undated) DEVICE — CG INFILTRATION TUBING: Brand: CG INFILTRATION TUBING

## (undated) DEVICE — TOWEL: OR BLU 80/CS: Brand: MEDICAL ACTION INDUSTRIES

## (undated) DEVICE — DRAPE,TAPE STRIPS,STERILE: Brand: MEDLINE